# Patient Record
Sex: FEMALE | Race: WHITE | NOT HISPANIC OR LATINO | Employment: OTHER | URBAN - NONMETROPOLITAN AREA
[De-identification: names, ages, dates, MRNs, and addresses within clinical notes are randomized per-mention and may not be internally consistent; named-entity substitution may affect disease eponyms.]

---

## 2024-04-20 ENCOUNTER — HOSPITAL ENCOUNTER (EMERGENCY)
Facility: HOSPITAL | Age: 79
Discharge: HOME | End: 2024-04-20
Attending: EMERGENCY MEDICINE
Payer: MEDICARE

## 2024-04-20 VITALS
DIASTOLIC BLOOD PRESSURE: 94 MMHG | HEART RATE: 88 BPM | BODY MASS INDEX: 22.14 KG/M2 | SYSTOLIC BLOOD PRESSURE: 194 MMHG | OXYGEN SATURATION: 97 % | RESPIRATION RATE: 48 BRPM | WEIGHT: 125 LBS | TEMPERATURE: 99 F

## 2024-04-20 DIAGNOSIS — R10.9 ABDOMINAL PAIN, UNSPECIFIED ABDOMINAL LOCATION: ICD-10-CM

## 2024-04-20 DIAGNOSIS — K59.00 CONSTIPATION: Primary | ICD-10-CM

## 2024-04-20 LAB
ALBUMIN SERPL BCP-MCNC: 3.2 G/DL (ref 3.5–5.2)
ALP SERPL-CCNC: 87 U/L (ref 55–135)
ALT SERPL W/O P-5'-P-CCNC: 24 U/L (ref 10–44)
ANION GAP SERPL CALC-SCNC: 7 MMOL/L (ref 3–11)
AST SERPL-CCNC: 19 U/L (ref 10–40)
BASOPHILS # BLD AUTO: 0.02 K/UL (ref 0–0.2)
BASOPHILS NFR BLD: 0.2 % (ref 0–1.9)
BILIRUB SERPL-MCNC: 0.7 MG/DL (ref 0.1–1)
BUN SERPL-MCNC: 12 MG/DL (ref 8–23)
CALCIUM SERPL-MCNC: 9.2 MG/DL (ref 8.7–10.5)
CHLORIDE SERPL-SCNC: 100 MMOL/L (ref 95–110)
CO2 SERPL-SCNC: 29 MMOL/L (ref 23–29)
CREAT SERPL-MCNC: 0.7 MG/DL (ref 0.5–1.4)
DIFFERENTIAL METHOD BLD: ABNORMAL
EOSINOPHIL # BLD AUTO: 0 K/UL (ref 0–0.5)
EOSINOPHIL NFR BLD: 0.2 % (ref 0–8)
ERYTHROCYTE [DISTWIDTH] IN BLOOD BY AUTOMATED COUNT: 14.6 % (ref 11.5–14.5)
EST. GFR  (NO RACE VARIABLE): >60 ML/MIN/1.73 M^2
GLUCOSE SERPL-MCNC: 191 MG/DL (ref 70–110)
HCT VFR BLD AUTO: 41.1 % (ref 37–48.5)
HGB BLD-MCNC: 13.3 G/DL (ref 12–16)
IMM GRANULOCYTES # BLD AUTO: 0.06 K/UL (ref 0–0.04)
IMM GRANULOCYTES NFR BLD AUTO: 0.5 % (ref 0–0.5)
LYMPHOCYTES # BLD AUTO: 0.3 K/UL (ref 1–4.8)
LYMPHOCYTES NFR BLD: 2.6 % (ref 18–48)
MCH RBC QN AUTO: 27.7 PG (ref 27–31)
MCHC RBC AUTO-ENTMCNC: 32.4 G/DL (ref 32–36)
MCV RBC AUTO: 86 FL (ref 82–98)
MONOCYTES # BLD AUTO: 0.5 K/UL (ref 0.3–1)
MONOCYTES NFR BLD: 3.6 % (ref 4–15)
NEUTROPHILS # BLD AUTO: 11.6 K/UL (ref 1.8–7.7)
NEUTROPHILS NFR BLD: 92.9 % (ref 38–73)
NRBC BLD-RTO: 0 /100 WBC
PLATELET # BLD AUTO: 282 K/UL (ref 150–450)
PMV BLD AUTO: 9.3 FL (ref 9.2–12.9)
POTASSIUM SERPL-SCNC: 3.5 MMOL/L (ref 3.5–5.1)
PROT SERPL-MCNC: 8.2 G/DL (ref 6–8.4)
RBC # BLD AUTO: 4.8 M/UL (ref 4–5.4)
SODIUM SERPL-SCNC: 136 MMOL/L (ref 136–145)
WBC # BLD AUTO: 12.49 K/UL (ref 3.9–12.7)

## 2024-04-20 PROCEDURE — 63600175 PHARM REV CODE 636 W HCPCS: Performed by: NURSE PRACTITIONER

## 2024-04-20 PROCEDURE — 99285 EMERGENCY DEPT VISIT HI MDM: CPT | Mod: 25

## 2024-04-20 PROCEDURE — 96372 THER/PROPH/DIAG INJ SC/IM: CPT | Performed by: NURSE PRACTITIONER

## 2024-04-20 PROCEDURE — 80053 COMPREHEN METABOLIC PANEL: CPT | Performed by: NURSE PRACTITIONER

## 2024-04-20 PROCEDURE — 85025 COMPLETE CBC W/AUTO DIFF WBC: CPT | Performed by: NURSE PRACTITIONER

## 2024-04-20 PROCEDURE — 36415 COLL VENOUS BLD VENIPUNCTURE: CPT | Performed by: NURSE PRACTITIONER

## 2024-04-20 PROCEDURE — 25000003 PHARM REV CODE 250: Performed by: NURSE PRACTITIONER

## 2024-04-20 RX ORDER — LACTULOSE 10 G/15ML
20 SOLUTION ORAL
Status: COMPLETED | OUTPATIENT
Start: 2024-04-20 | End: 2024-04-20

## 2024-04-20 RX ORDER — DOCUSATE SODIUM 100 MG/1
100 CAPSULE, LIQUID FILLED ORAL 3 TIMES DAILY PRN
Qty: 30 CAPSULE | Refills: 0 | Status: SHIPPED | OUTPATIENT
Start: 2024-04-20 | End: 2024-05-01

## 2024-04-20 RX ORDER — FENTANYL CITRATE 50 UG/ML
75 INJECTION, SOLUTION INTRAMUSCULAR; INTRAVENOUS
Status: COMPLETED | OUTPATIENT
Start: 2024-04-20 | End: 2024-04-20

## 2024-04-20 RX ORDER — LACTULOSE 10 G/15ML
20 SOLUTION ORAL EVERY 8 HOURS PRN
Qty: 15 ML | Refills: 0 | Status: SHIPPED | OUTPATIENT
Start: 2024-04-20 | End: 2024-04-25

## 2024-04-20 RX ORDER — GLYCERIN 1 G/1
1 SUPPOSITORY RECTAL ONCE
Status: COMPLETED | OUTPATIENT
Start: 2024-04-20 | End: 2024-04-20

## 2024-04-20 RX ORDER — ONDANSETRON 4 MG/1
4 TABLET, ORALLY DISINTEGRATING ORAL
Status: COMPLETED | OUTPATIENT
Start: 2024-04-20 | End: 2024-04-20

## 2024-04-20 RX ADMIN — ONDANSETRON 4 MG: 4 TABLET, ORALLY DISINTEGRATING ORAL at 03:04

## 2024-04-20 RX ADMIN — GLYCERIN 1 SUPPOSITORY: 2 SUPPOSITORY RECTAL at 05:04

## 2024-04-20 RX ADMIN — LACTULOSE 20 G: 20 SOLUTION ORAL at 05:04

## 2024-04-20 RX ADMIN — FENTANYL CITRATE 75 MCG: 50 INJECTION INTRAMUSCULAR; INTRAVENOUS at 03:04

## 2024-04-20 NOTE — DISCHARGE INSTRUCTIONS
Drink plenty of fluids and follow a high-fiber diet.  Use medication as directed.  Plan to see your primary doctor in 1-2 days and return to the emergency department for worsening condition.

## 2024-04-20 NOTE — ED PROVIDER NOTES
Encounter Date: 2024       History     Chief Complaint   Patient presents with    Constipation     Pt stated that she has been experiencing constipation for the past few days. Last BM approx 3-4 days ago with minimal output.        This is an  78-year-old white female with a history of hypertension and CAD who presents to the emergency department with complaints of abdominal pain which she attributes to constipation.  She reports lower abdominal cramping pain with no bowel movement over the last 3-4 days.  She has also developed some mild nausea, however denies vomiting, dysuria, or any other relative symptoms at this time.  She has experienced similar symptoms in the past but reports today is worse.      Review of patient's allergies indicates:  No Known Allergies  Past Medical History:   Diagnosis Date    Coronary artery disease     High cholesterol     Hypertension      Past Surgical History:   Procedure Laterality Date    HYSTERECTOMY      OOPHORECTOMY       Family History   Problem Relation Name Age of Onset    Cancer Brother      Cancer Brother      No Known Problems Mother      No Known Problems Father      No Known Problems Sister      No Known Problems Daughter      No Known Problems Maternal Aunt      No Known Problems Maternal Uncle      No Known Problems Paternal Aunt      No Known Problems Paternal Uncle      No Known Problems Maternal Grandmother      No Known Problems Maternal Grandfather      No Known Problems Paternal Grandmother      No Known Problems Paternal Grandfather      Asthma Neg Hx      Emphysema Neg Hx      Breast cancer Neg Hx      Ovarian cancer Neg Hx      BRCA 1/2 Neg Hx       Social History     Tobacco Use    Smoking status: Former     Current packs/day: 0.00     Average packs/day: 1 pack/day for 30.0 years (30.0 ttl pk-yrs)     Types: Cigarettes     Start date: 1977     Quit date: 2007     Years since quittin.9    Smokeless tobacco: Never   Substance Use Topics     Alcohol use: No    Drug use: No     Review of Systems   Constitutional:  Negative for fever.   Gastrointestinal:  Positive for abdominal pain, constipation and nausea. Negative for diarrhea and vomiting.   Genitourinary:  Negative for dysuria.       Physical Exam     Initial Vitals   BP Pulse Resp Temp SpO2   04/20/24 1446 04/20/24 1443 04/20/24 1443 04/20/24 1443 04/20/24 1443   (!) 194/94 88 16 98.5 °F (36.9 °C) 97 %      MAP       --                Physical Exam    Nursing note and vitals reviewed.  Constitutional: She appears well-developed and well-nourished. She is active. No distress.   HENT:   Head: Normocephalic and atraumatic.   Eyes: EOM are normal. Pupils are equal, round, and reactive to light.   Neck: Neck supple.   Normal range of motion.  Cardiovascular:  Normal rate, regular rhythm and normal heart sounds.           Pulmonary/Chest: Breath sounds normal. No respiratory distress.   Abdominal: Bowel sounds are normal. She exhibits no distension. There is abdominal tenderness (lower generalized).   Musculoskeletal:      Cervical back: Normal range of motion and neck supple.     Neurological: She is alert and oriented to person, place, and time. GCS eye subscore is 4. GCS verbal subscore is 5. GCS motor subscore is 6.   Skin: Skin is warm and dry. Capillary refill takes less than 2 seconds.   Psychiatric: She has a normal mood and affect. Her behavior is normal. Thought content normal.         ED Course   Procedures  Labs Reviewed   CBC W/ AUTO DIFFERENTIAL - Abnormal; Notable for the following components:       Result Value    RDW 14.6 (*)     Gran # (ANC) 11.6 (*)     Immature Grans (Abs) 0.06 (*)     Lymph # 0.3 (*)     Gran % 92.9 (*)     Lymph % 2.6 (*)     Mono % 3.6 (*)     All other components within normal limits   COMPREHENSIVE METABOLIC PANEL - Abnormal; Notable for the following components:    Glucose 191 (*)     Albumin 3.2 (*)     All other components within normal limits           Imaging Results              CT Abdomen Pelvis  Without Contrast (In process)                       X-Ray Abdomen Flat And Erect (Final result)  Result time 04/20/24 16:41:29      Final result by Leeanne Mark MD (04/20/24 16:41:29)                   Impression:      No bowel obstruction    Calcified aortic aneurysm transversely the measurements are 7.4 cm recommend ultrasound for further evaluation This report was flagged in Epic as abnormal.    COMMUNICATION  This critical result was called to michelle rowley at time of dictation      Electronically signed by: Leeanne Mark MD  Date:    04/20/2024  Time:    16:41               Narrative:    EXAMINATION:  XR ABDOMEN FLAT AND ERECT    CLINICAL HISTORY:  Constipation, unspecified    COMPARISON:  None.    FINDINGS:  There is significant calcific densities in the soft tissues of the right pelvis.  There is abdominal aortic calcified aneurysm transversely it measures approximately 7.4 cm.                                       Medications   ondansetron disintegrating tablet 4 mg (4 mg Oral Given 4/20/24 1525)   fentaNYL injection 75 mcg (75 mcg Intramuscular Given 4/20/24 1525)   glycerin adult suppository 1 suppository (1 suppository Rectal Given 4/20/24 1735)   lactulose 20 gram/30 mL solution Soln 20 g (20 g Oral Given 4/20/24 1735)     Medical Decision Making  Amount and/or Complexity of Data Reviewed  Labs: ordered.  Radiology: ordered.    Risk  OTC drugs.  Prescription drug management.               ED Course as of 04/20/24 1812   Sat Apr 20, 2024   1755  Labs relatively unremarkable.  Two-view abdominal x-ray reveals possible abnormal gas pattern with potential for free air prompting CT abdomen pelvis which revealed no acute findings.  Patient reports currently under care of a physician for abdominal aneurysms.  Will treat for constipation. [CB]      ED Course User Index  [CB] Michelle Rowley NP                           Clinical Impression:  Final  diagnoses:  [K59.00] Constipation (Primary)  [R10.9] Abdominal pain, unspecified abdominal location          ED Disposition Condition    Discharge Stable          ED Prescriptions       Medication Sig Dispense Start Date End Date Auth. Provider    lactulose (CHRONULAC) 20 gram/30 mL Soln Take 30 mLs (20 g total) by mouth every 8 (eight) hours as needed (constipation). 15 mL 4/20/2024 4/25/2024 Michelle Rowley NP    docusate sodium (COLACE) 100 MG capsule Take 1 capsule (100 mg total) by mouth 3 (three) times daily as needed for Constipation. 30 capsule 4/20/2024 4/30/2024 Michelle Rowley NP          Follow-up Information       Follow up With Specialties Details Why Contact Info    PCP Follow UP  Schedule an appointment as soon as possible for a visit in 2 days for follow-up, for re-evaluation of today's complaint              Michelle Rowley NP  04/20/24 3821

## 2024-04-25 ENCOUNTER — HOSPITAL ENCOUNTER (EMERGENCY)
Facility: HOSPITAL | Age: 79
Discharge: HOME | End: 2024-04-25
Attending: EMERGENCY MEDICINE
Payer: MEDICARE

## 2024-04-25 VITALS
HEIGHT: 60 IN | BODY MASS INDEX: 22.38 KG/M2 | RESPIRATION RATE: 20 BRPM | OXYGEN SATURATION: 97 % | TEMPERATURE: 98 F | WEIGHT: 114 LBS | SYSTOLIC BLOOD PRESSURE: 114 MMHG | HEART RATE: 85 BPM | DIASTOLIC BLOOD PRESSURE: 75 MMHG

## 2024-04-25 DIAGNOSIS — M54.50 BILATERAL LOW BACK PAIN WITHOUT SCIATICA, UNSPECIFIED CHRONICITY: Primary | ICD-10-CM

## 2024-04-25 DIAGNOSIS — N39.0 URINARY TRACT INFECTION WITHOUT HEMATURIA, SITE UNSPECIFIED: ICD-10-CM

## 2024-04-25 LAB
ALBUMIN SERPL BCP-MCNC: 2.9 G/DL (ref 3.5–5.2)
ALP SERPL-CCNC: 81 U/L (ref 55–135)
ALT SERPL W/O P-5'-P-CCNC: 22 U/L (ref 10–44)
ANION GAP SERPL CALC-SCNC: 9 MMOL/L (ref 3–11)
AST SERPL-CCNC: 19 U/L (ref 10–40)
BACTERIA #/AREA URNS HPF: NEGATIVE /HPF
BASOPHILS # BLD AUTO: 0.02 K/UL (ref 0–0.2)
BASOPHILS NFR BLD: 0.2 % (ref 0–1.9)
BILIRUB SERPL-MCNC: 0.5 MG/DL (ref 0.1–1)
BILIRUB UR QL STRIP: ABNORMAL
BUN SERPL-MCNC: 15 MG/DL (ref 8–23)
CALCIUM SERPL-MCNC: 9.5 MG/DL (ref 8.7–10.5)
CHLORIDE SERPL-SCNC: 97 MMOL/L (ref 95–110)
CLARITY UR: CLEAR
CO2 SERPL-SCNC: 28 MMOL/L (ref 23–29)
COLOR UR: ABNORMAL
CREAT SERPL-MCNC: 0.8 MG/DL (ref 0.5–1.4)
DIFFERENTIAL METHOD BLD: ABNORMAL
EOSINOPHIL # BLD AUTO: 0.1 K/UL (ref 0–0.5)
EOSINOPHIL NFR BLD: 0.9 % (ref 0–8)
ERYTHROCYTE [DISTWIDTH] IN BLOOD BY AUTOMATED COUNT: 14.4 % (ref 11.5–14.5)
EST. GFR  (NO RACE VARIABLE): >60 ML/MIN/1.73 M^2
GLUCOSE SERPL-MCNC: 111 MG/DL (ref 70–110)
GLUCOSE UR QL STRIP: NEGATIVE
HCT VFR BLD AUTO: 38.1 % (ref 37–48.5)
HGB BLD-MCNC: 12.5 G/DL (ref 12–16)
HGB UR QL STRIP: NEGATIVE
HYALINE CASTS #/AREA URNS LPF: 0.3 /LPF
IMM GRANULOCYTES # BLD AUTO: 0.05 K/UL (ref 0–0.04)
IMM GRANULOCYTES NFR BLD AUTO: 0.5 % (ref 0–0.5)
KETONES UR QL STRIP: ABNORMAL
LEUKOCYTE ESTERASE UR QL STRIP: ABNORMAL
LYMPHOCYTES # BLD AUTO: 0.6 K/UL (ref 1–4.8)
LYMPHOCYTES NFR BLD: 5.8 % (ref 18–48)
MCH RBC QN AUTO: 27.7 PG (ref 27–31)
MCHC RBC AUTO-ENTMCNC: 32.8 G/DL (ref 32–36)
MCV RBC AUTO: 85 FL (ref 82–98)
MICROSCOPIC COMMENT: ABNORMAL
MONOCYTES # BLD AUTO: 0.9 K/UL (ref 0.3–1)
MONOCYTES NFR BLD: 8.4 % (ref 4–15)
NEUTROPHILS # BLD AUTO: 8.6 K/UL (ref 1.8–7.7)
NEUTROPHILS NFR BLD: 84.2 % (ref 38–73)
NITRITE UR QL STRIP: POSITIVE
NRBC BLD-RTO: 0 /100 WBC
PH UR STRIP: 5 [PH] (ref 5–8)
PLATELET # BLD AUTO: 291 K/UL (ref 150–450)
PMV BLD AUTO: 9.3 FL (ref 9.2–12.9)
POTASSIUM SERPL-SCNC: 3.8 MMOL/L (ref 3.5–5.1)
PROT SERPL-MCNC: 7.9 G/DL (ref 6–8.4)
PROT UR QL STRIP: ABNORMAL
RBC # BLD AUTO: 4.51 M/UL (ref 4–5.4)
RBC #/AREA URNS HPF: 5 /HPF (ref 0–4)
SODIUM SERPL-SCNC: 134 MMOL/L (ref 136–145)
SP GR UR STRIP: 1.02 (ref 1–1.03)
SQUAMOUS #/AREA URNS HPF: 0 /HPF
URN SPEC COLLECT METH UR: ABNORMAL
UROBILINOGEN UR STRIP-ACNC: 1 EU/DL
WBC # BLD AUTO: 10.19 K/UL (ref 3.9–12.7)
WBC #/AREA URNS HPF: 5 /HPF (ref 0–5)

## 2024-04-25 PROCEDURE — 96372 THER/PROPH/DIAG INJ SC/IM: CPT | Performed by: NURSE PRACTITIONER

## 2024-04-25 PROCEDURE — 36415 COLL VENOUS BLD VENIPUNCTURE: CPT | Performed by: NURSE PRACTITIONER

## 2024-04-25 PROCEDURE — 87086 URINE CULTURE/COLONY COUNT: CPT | Performed by: NURSE PRACTITIONER

## 2024-04-25 PROCEDURE — 63600175 PHARM REV CODE 636 W HCPCS: Performed by: NURSE PRACTITIONER

## 2024-04-25 PROCEDURE — 99284 EMERGENCY DEPT VISIT MOD MDM: CPT | Mod: 25

## 2024-04-25 PROCEDURE — 85025 COMPLETE CBC W/AUTO DIFF WBC: CPT | Performed by: NURSE PRACTITIONER

## 2024-04-25 PROCEDURE — 25000003 PHARM REV CODE 250: Performed by: NURSE PRACTITIONER

## 2024-04-25 PROCEDURE — 80053 COMPREHEN METABOLIC PANEL: CPT | Performed by: NURSE PRACTITIONER

## 2024-04-25 PROCEDURE — 81000 URINALYSIS NONAUTO W/SCOPE: CPT | Performed by: NURSE PRACTITIONER

## 2024-04-25 RX ORDER — NITROFURANTOIN 25; 75 MG/1; MG/1
100 CAPSULE ORAL 2 TIMES DAILY
Qty: 14 CAPSULE | Refills: 0 | Status: SHIPPED | OUTPATIENT
Start: 2024-04-25 | End: 2024-04-25

## 2024-04-25 RX ORDER — NITROFURANTOIN 25; 75 MG/1; MG/1
100 CAPSULE ORAL 2 TIMES DAILY
Qty: 14 CAPSULE | Refills: 0 | Status: SHIPPED | OUTPATIENT
Start: 2024-04-25 | End: 2024-04-25 | Stop reason: ALTCHOICE

## 2024-04-25 RX ORDER — HYDROCODONE BITARTRATE AND ACETAMINOPHEN 5; 325 MG/1; MG/1
1 TABLET ORAL EVERY 8 HOURS PRN
Qty: 15 TABLET | Refills: 0 | Status: SHIPPED | OUTPATIENT
Start: 2024-04-25 | End: 2024-05-01

## 2024-04-25 RX ORDER — ONDANSETRON 4 MG/1
4 TABLET, ORALLY DISINTEGRATING ORAL
Status: COMPLETED | OUTPATIENT
Start: 2024-04-25 | End: 2024-04-25

## 2024-04-25 RX ORDER — HYDROCODONE BITARTRATE AND ACETAMINOPHEN 5; 325 MG/1; MG/1
1 TABLET ORAL EVERY 8 HOURS PRN
Qty: 15 TABLET | Refills: 0 | Status: SHIPPED | OUTPATIENT
Start: 2024-04-25 | End: 2024-04-25

## 2024-04-25 RX ORDER — MORPHINE SULFATE 4 MG/ML
4 INJECTION, SOLUTION INTRAMUSCULAR; INTRAVENOUS
Status: COMPLETED | OUTPATIENT
Start: 2024-04-25 | End: 2024-04-25

## 2024-04-25 RX ADMIN — MORPHINE SULFATE 4 MG: 4 INJECTION INTRAVENOUS at 05:04

## 2024-04-25 RX ADMIN — ONDANSETRON 4 MG: 4 TABLET, ORALLY DISINTEGRATING ORAL at 05:04

## 2024-04-25 NOTE — DISCHARGE INSTRUCTIONS
Be sure to complete all antibiotics.  Eat a regular diet and drink plenty of fluids.  Empty your bladder frequently.  Plan to see your primary doctor for further evaluation of your symptoms and return to the emergency department for worsening condition.

## 2024-04-25 NOTE — ED PROVIDER NOTES
"Encounter Date: 4/25/2024       History     Chief Complaint   Patient presents with    Back Pain     Pt. Presents with back pain that started for a week. States she was here last week and got a shot that did not work.      This is an 78-year-old white female with a history of CAD, hypertension, abdominal aortic aneurysm who presents to the emergency department as instructed by her PCP for evaluation of lower back pain.  Patient reports that over the last week she has been experiencing bilateral lower back pain that is worse with movement.  She underwent urinalysis by her PCP today and was told she had "blood in the kidneys" and was referred to the ED for further evaluation.  The patient was evaluated here in the emergency department 1 week ago for complaints of abdominal pain and constipation which has since resolved.  She denies burning upon urination, fever, radiation of back pain into lower extremities, bladder/bowel dysfunction, or saddle paresthesias.      Review of patient's allergies indicates:  No Known Allergies  Past Medical History:   Diagnosis Date    Coronary artery disease     High cholesterol     Hypertension      Past Surgical History:   Procedure Laterality Date    HYSTERECTOMY      OOPHORECTOMY       Family History   Problem Relation Name Age of Onset    Cancer Brother      Cancer Brother      No Known Problems Mother      No Known Problems Father      No Known Problems Sister      No Known Problems Daughter      No Known Problems Maternal Aunt      No Known Problems Maternal Uncle      No Known Problems Paternal Aunt      No Known Problems Paternal Uncle      No Known Problems Maternal Grandmother      No Known Problems Maternal Grandfather      No Known Problems Paternal Grandmother      No Known Problems Paternal Grandfather      Asthma Neg Hx      Emphysema Neg Hx      Breast cancer Neg Hx      Ovarian cancer Neg Hx      BRCA 1/2 Neg Hx       Social History     Tobacco Use    Smoking status: " Former     Current packs/day: 0.00     Average packs/day: 1 pack/day for 30.0 years (30.0 ttl pk-yrs)     Types: Cigarettes     Start date: 1977     Quit date: 2007     Years since quittin.0    Smokeless tobacco: Never   Substance Use Topics    Alcohol use: No    Drug use: No     Review of Systems   Musculoskeletal:  Positive for back pain. Negative for gait problem.   Neurological:  Negative for numbness.       Physical Exam     Initial Vitals [24 1624]   BP Pulse Resp Temp SpO2   114/75 85 20 98.2 °F (36.8 °C) 97 %      MAP       --         Physical Exam    Nursing note and vitals reviewed.  Constitutional: She appears well-developed and well-nourished. She is active. No distress.   HENT:   Head: Normocephalic and atraumatic.   Eyes: EOM are normal. Pupils are equal, round, and reactive to light.   Neck: Neck supple.   Normal range of motion.  Cardiovascular:  Normal rate, regular rhythm and normal heart sounds.           Pulmonary/Chest: Breath sounds normal. No respiratory distress.   Musculoskeletal:         General: No tenderness. Normal range of motion.      Cervical back: Normal range of motion and neck supple.      Comments: The low back appears normal upon inspection, no rash or discoloration noted.  Patient is nontender to palpation, muscle strength to lower extremities is 5/5.     Neurological: She is alert and oriented to person, place, and time. GCS eye subscore is 4. GCS verbal subscore is 5. GCS motor subscore is 6.   Skin: Skin is warm and dry. Capillary refill takes less than 2 seconds.   Psychiatric: She has a normal mood and affect. Her behavior is normal. Thought content normal.         ED Course   Procedures  Labs Reviewed   URINALYSIS, REFLEX TO URINE CULTURE - Abnormal; Notable for the following components:       Result Value    Color, UA Orange (*)     Protein, UA 1+ (*)     Ketones, UA 2+ (*)     Bilirubin (UA) 2+ (*)     Nitrite, UA Positive (*)     Leukocytes, UA 2+  (*)     All other components within normal limits    Narrative:     Preferred Collection Type->Urine, Clean Catch  Specimen Source->Urine   CBC W/ AUTO DIFFERENTIAL - Abnormal; Notable for the following components:    Gran # (ANC) 8.6 (*)     Immature Grans (Abs) 0.05 (*)     Lymph # 0.6 (*)     Gran % 84.2 (*)     Lymph % 5.8 (*)     All other components within normal limits   COMPREHENSIVE METABOLIC PANEL - Abnormal; Notable for the following components:    Sodium 134 (*)     Glucose 111 (*)     Albumin 2.9 (*)     All other components within normal limits   URINALYSIS MICROSCOPIC - Abnormal; Notable for the following components:    RBC, UA 5 (*)     Hyaline Casts, UA 0.3 (*)     All other components within normal limits    Narrative:     Preferred Collection Type->Urine, Clean Catch  Specimen Source->Urine   CULTURE, URINE          Imaging Results    None          Medications   morphine injection 4 mg (4 mg Intramuscular Given 4/25/24 1731)   ondansetron disintegrating tablet 4 mg (4 mg Oral Given 4/25/24 1731)     Medical Decision Making  Amount and/or Complexity of Data Reviewed  Labs: ordered.    Risk  Prescription drug management.               ED Course as of 04/25/24 1810   Thu Apr 25, 2024   1741 Urinalysis reveals 5 white blood cells in the presence of positive nitrites.  Given ongoing low back pain will cover for urinary tract infection, culture pending [CB]   1810 Upon discharge pt recalls that her pcp had already called in antibiotics for uti.  [CB]      ED Course User Index  [CB] Michelle Rowley NP                           Clinical Impression:  Final diagnoses:  [M54.50] Bilateral low back pain without sciatica, unspecified chronicity (Primary)  [N39.0] Urinary tract infection without hematuria, site unspecified          ED Disposition Condition    Discharge Stable          ED Prescriptions       Medication Sig Dispense Start Date End Date Auth. Provider    nitrofurantoin,  macrocrystal-monohydrate, (MACROBID) 100 MG capsule  (Status: Discontinued) Take 1 capsule (100 mg total) by mouth 2 (two) times daily. for 7 days 14 capsule 4/25/2024 4/25/2024 Michelle Rowley NP    HYDROcodone-acetaminophen (NORCO) 5-325 mg per tablet  (Status: Discontinued) Take 1 tablet by mouth every 8 (eight) hours as needed for Pain. 15 tablet 4/25/2024 4/25/2024 Michelle Rowley NP    HYDROcodone-acetaminophen (NORCO) 5-325 mg per tablet Take 1 tablet by mouth every 8 (eight) hours as needed for Pain. 15 tablet 4/25/2024 4/30/2024 Michelle Rowley NP    nitrofurantoin, macrocrystal-monohydrate, (MACROBID) 100 MG capsule  (Status: Discontinued) Take 1 capsule (100 mg total) by mouth 2 (two) times daily. for 7 days 14 capsule 4/25/2024 4/25/2024 Michelle Rowley NP          Follow-up Information       Follow up With Specialties Details Why Contact Info    PCP Follow UP  Schedule an appointment as soon as possible for a visit in 2 days for follow-up, for re-evaluation of today's complaint              Michelle Rowley NP  04/25/24 [id number]       Michelle Rowley NP  04/25/24 [id number]

## 2024-04-27 ENCOUNTER — HOSPITAL ENCOUNTER (EMERGENCY)
Facility: HOSPITAL | Age: 79
Discharge: HOME | End: 2024-04-28
Attending: STUDENT IN AN ORGANIZED HEALTH CARE EDUCATION/TRAINING PROGRAM
Payer: MEDICARE

## 2024-04-27 DIAGNOSIS — N39.0 URINARY TRACT INFECTION WITHOUT HEMATURIA, SITE UNSPECIFIED: Primary | ICD-10-CM

## 2024-04-27 DIAGNOSIS — I71.43 INFRARENAL ABDOMINAL AORTIC ANEURYSM (AAA) WITHOUT RUPTURE: ICD-10-CM

## 2024-04-27 LAB
ALBUMIN SERPL BCP-MCNC: 2.6 G/DL (ref 3.5–5.2)
ALP SERPL-CCNC: 73 U/L (ref 55–135)
ALT SERPL W/O P-5'-P-CCNC: 19 U/L (ref 10–44)
ANION GAP SERPL CALC-SCNC: 6 MMOL/L (ref 3–11)
AST SERPL-CCNC: 18 U/L (ref 10–40)
BACTERIA #/AREA URNS HPF: NEGATIVE /HPF
BACTERIA UR CULT: NORMAL
BACTERIA UR CULT: NORMAL
BASOPHILS # BLD AUTO: 0.03 K/UL (ref 0–0.2)
BASOPHILS NFR BLD: 0.3 % (ref 0–1.9)
BILIRUB SERPL-MCNC: 0.4 MG/DL (ref 0.1–1)
BILIRUB UR QL STRIP: NEGATIVE
BUN SERPL-MCNC: 13 MG/DL (ref 8–23)
CALCIUM SERPL-MCNC: 9.1 MG/DL (ref 8.7–10.5)
CHLORIDE SERPL-SCNC: 100 MMOL/L (ref 95–110)
CLARITY UR: ABNORMAL
CO2 SERPL-SCNC: 30 MMOL/L (ref 23–29)
COLOR UR: YELLOW
CREAT SERPL-MCNC: 0.9 MG/DL (ref 0.5–1.4)
DIFFERENTIAL METHOD BLD: ABNORMAL
EOSINOPHIL # BLD AUTO: 0.1 K/UL (ref 0–0.5)
EOSINOPHIL NFR BLD: 0.4 % (ref 0–8)
ERYTHROCYTE [DISTWIDTH] IN BLOOD BY AUTOMATED COUNT: 14.6 % (ref 11.5–14.5)
EST. GFR  (NO RACE VARIABLE): >60 ML/MIN/1.73 M^2
GLUCOSE SERPL-MCNC: 139 MG/DL (ref 70–110)
GLUCOSE UR QL STRIP: NEGATIVE
HCT VFR BLD AUTO: 37.4 % (ref 37–48.5)
HGB BLD-MCNC: 11.7 G/DL (ref 12–16)
HGB UR QL STRIP: ABNORMAL
HYALINE CASTS #/AREA URNS LPF: 0.3 /LPF
IMM GRANULOCYTES # BLD AUTO: 0.09 K/UL (ref 0–0.04)
IMM GRANULOCYTES NFR BLD AUTO: 0.8 % (ref 0–0.5)
KETONES UR QL STRIP: ABNORMAL
LEUKOCYTE ESTERASE UR QL STRIP: ABNORMAL
LYMPHOCYTES # BLD AUTO: 0.7 K/UL (ref 1–4.8)
LYMPHOCYTES NFR BLD: 5.7 % (ref 18–48)
MCH RBC QN AUTO: 27.4 PG (ref 27–31)
MCHC RBC AUTO-ENTMCNC: 31.3 G/DL (ref 32–36)
MCV RBC AUTO: 88 FL (ref 82–98)
MICROSCOPIC COMMENT: ABNORMAL
MONOCYTES # BLD AUTO: 0.9 K/UL (ref 0.3–1)
MONOCYTES NFR BLD: 7.7 % (ref 4–15)
NEUTROPHILS # BLD AUTO: 9.7 K/UL (ref 1.8–7.7)
NEUTROPHILS NFR BLD: 85.1 % (ref 38–73)
NITRITE UR QL STRIP: POSITIVE
NRBC BLD-RTO: 0 /100 WBC
PH UR STRIP: 7 [PH] (ref 5–8)
PLATELET # BLD AUTO: 249 K/UL (ref 150–450)
PMV BLD AUTO: 9.2 FL (ref 9.2–12.9)
POTASSIUM SERPL-SCNC: 4.4 MMOL/L (ref 3.5–5.1)
PROT SERPL-MCNC: 7 G/DL (ref 6–8.4)
PROT UR QL STRIP: NEGATIVE
RBC # BLD AUTO: 4.27 M/UL (ref 4–5.4)
RBC #/AREA URNS HPF: 2 /HPF (ref 0–4)
SODIUM SERPL-SCNC: 136 MMOL/L (ref 136–145)
SP GR UR STRIP: <=1.005 (ref 1–1.03)
SQUAMOUS #/AREA URNS HPF: 5 /HPF
URN SPEC COLLECT METH UR: ABNORMAL
UROBILINOGEN UR STRIP-ACNC: 1 EU/DL
WBC # BLD AUTO: 11.41 K/UL (ref 3.9–12.7)
WBC #/AREA URNS HPF: 73 /HPF (ref 0–5)

## 2024-04-27 PROCEDURE — 25000003 PHARM REV CODE 250: Performed by: STUDENT IN AN ORGANIZED HEALTH CARE EDUCATION/TRAINING PROGRAM

## 2024-04-27 PROCEDURE — 96375 TX/PRO/DX INJ NEW DRUG ADDON: CPT

## 2024-04-27 PROCEDURE — 96374 THER/PROPH/DIAG INJ IV PUSH: CPT

## 2024-04-27 PROCEDURE — 81000 URINALYSIS NONAUTO W/SCOPE: CPT | Performed by: STUDENT IN AN ORGANIZED HEALTH CARE EDUCATION/TRAINING PROGRAM

## 2024-04-27 PROCEDURE — 36415 COLL VENOUS BLD VENIPUNCTURE: CPT | Performed by: STUDENT IN AN ORGANIZED HEALTH CARE EDUCATION/TRAINING PROGRAM

## 2024-04-27 PROCEDURE — 99285 EMERGENCY DEPT VISIT HI MDM: CPT | Mod: 25

## 2024-04-27 PROCEDURE — 87086 URINE CULTURE/COLONY COUNT: CPT | Performed by: STUDENT IN AN ORGANIZED HEALTH CARE EDUCATION/TRAINING PROGRAM

## 2024-04-27 PROCEDURE — 85025 COMPLETE CBC W/AUTO DIFF WBC: CPT | Performed by: STUDENT IN AN ORGANIZED HEALTH CARE EDUCATION/TRAINING PROGRAM

## 2024-04-27 PROCEDURE — 80053 COMPREHEN METABOLIC PANEL: CPT | Performed by: STUDENT IN AN ORGANIZED HEALTH CARE EDUCATION/TRAINING PROGRAM

## 2024-04-27 PROCEDURE — 96361 HYDRATE IV INFUSION ADD-ON: CPT

## 2024-04-27 PROCEDURE — 63600175 PHARM REV CODE 636 W HCPCS: Performed by: STUDENT IN AN ORGANIZED HEALTH CARE EDUCATION/TRAINING PROGRAM

## 2024-04-27 RX ORDER — ONDANSETRON HYDROCHLORIDE 2 MG/ML
4 INJECTION, SOLUTION INTRAVENOUS
Status: COMPLETED | OUTPATIENT
Start: 2024-04-27 | End: 2024-04-27

## 2024-04-27 RX ORDER — KETOROLAC TROMETHAMINE 30 MG/ML
15 INJECTION, SOLUTION INTRAMUSCULAR; INTRAVENOUS
Status: COMPLETED | OUTPATIENT
Start: 2024-04-27 | End: 2024-04-27

## 2024-04-27 RX ADMIN — KETOROLAC TROMETHAMINE 15 MG: 30 INJECTION, SOLUTION INTRAMUSCULAR; INTRAVENOUS at 10:04

## 2024-04-27 RX ADMIN — SODIUM CHLORIDE 500 ML: 9 INJECTION, SOLUTION INTRAVENOUS at 10:04

## 2024-04-27 RX ADMIN — ONDANSETRON 4 MG: 2 INJECTION INTRAMUSCULAR; INTRAVENOUS at 10:04

## 2024-04-28 VITALS
OXYGEN SATURATION: 99 % | TEMPERATURE: 98 F | RESPIRATION RATE: 18 BRPM | BODY MASS INDEX: 22.26 KG/M2 | DIASTOLIC BLOOD PRESSURE: 76 MMHG | SYSTOLIC BLOOD PRESSURE: 128 MMHG | WEIGHT: 114 LBS | HEART RATE: 95 BPM

## 2024-04-28 NOTE — ED NOTES
MD at bedside discussing possible transfer with pt. Pt states to see what a consulting vascular specialist says then let her know because she is ready to go home.

## 2024-04-28 NOTE — DISCHARGE INSTRUCTIONS
Continue abx as previous prescribed    F/u with vascular surgery for evaluation of AAA    Use colace as needed for constipation symptoms

## 2024-04-28 NOTE — ED PROVIDER NOTES
History  Chief Complaint   Patient presents with    Weakness     Pt reports coming to ER this past Saturday, her PCP on Tuesday and back to ER on Wednesday. Her diagnosis was constipation on Saturday, on Tuesday diagnosed with UTI. Pt now taking antibiotics and pain medications. PT reports coming in tonight due to having pain in lower back and side. Pt reports not taking narcos due to the constipation, pt also not taking Colace that was prescribed,.      78-year-old female presents for evaluation of lower abdominal discomfort.  Patient reports she was seen here 1 week ago for constipation.  Patient given what I presumed to be magnesium citrate with significant resolution of her symptoms.  Thereafter patient notes that she was extremely fatigued.  Patient was seen by her PCP in the head to come back to the ER 3 days ago she was diagnosed with a urinary tract infection given prescription for antibiotics as well as pain control.  Patient notes pain medications not helping she was continuing to experience lower abdominal pain radiating to her back.        Past Medical History:   Diagnosis Date    Coronary artery disease     High cholesterol     Hypertension        Past Surgical History:   Procedure Laterality Date    HYSTERECTOMY      OOPHORECTOMY         Family History   Problem Relation Name Age of Onset    Cancer Brother      Cancer Brother      No Known Problems Mother      No Known Problems Father      No Known Problems Sister      No Known Problems Daughter      No Known Problems Maternal Aunt      No Known Problems Maternal Uncle      No Known Problems Paternal Aunt      No Known Problems Paternal Uncle      No Known Problems Maternal Grandmother      No Known Problems Maternal Grandfather      No Known Problems Paternal Grandmother      No Known Problems Paternal Grandfather      Asthma Neg Hx      Emphysema Neg Hx      Breast cancer Neg Hx      Ovarian cancer Neg Hx      BRCA 1/2 Neg Hx         Social History      Tobacco Use    Smoking status: Former     Current packs/day: 0.00     Average packs/day: 1 pack/day for 30.0 years (30.0 ttl pk-yrs)     Types: Cigarettes     Start date: 1977     Quit date: 2007     Years since quittin.0    Smokeless tobacco: Never   Substance Use Topics    Alcohol use: No    Drug use: No       ROS  Review of Systems   Gastrointestinal:  Positive for abdominal pain.       Physical Exam  /76   Pulse 95   Temp 98.3 °F (36.8 °C)   Resp 18   Wt 51.7 kg (114 lb)   SpO2 99%   Breastfeeding No   BMI 22.26 kg/m²   Physical Exam    Constitutional: She appears well-developed and well-nourished. She is cooperative.   HENT:   Head: Normocephalic and atraumatic.   Eyes: Conjunctivae, EOM and lids are normal. Pupils are equal, round, and reactive to light.   Neck: Phonation normal.   Normal range of motion.  Cardiovascular:  Normal rate, regular rhythm and intact distal pulses.           Pulmonary/Chest: Breath sounds normal. No respiratory distress.   Abdominal: There is abdominal tenderness.   Musculoskeletal:      Cervical back: Normal range of motion.     Neurological: She is alert and oriented to person, place, and time.   Skin: Skin is warm and dry.   Psychiatric: She has a normal mood and affect. Her speech is normal and behavior is normal.               Labs Reviewed   CBC W/ AUTO DIFFERENTIAL - Abnormal; Notable for the following components:       Result Value    Hemoglobin 11.7 (*)     MCHC 31.3 (*)     RDW 14.6 (*)     Immature Granulocytes 0.8 (*)     Gran # (ANC) 9.7 (*)     Immature Grans (Abs) 0.09 (*)     Lymph # 0.7 (*)     Gran % 85.1 (*)     Lymph % 5.7 (*)     All other components within normal limits   COMPREHENSIVE METABOLIC PANEL - Abnormal; Notable for the following components:    CO2 30 (*)     Glucose 139 (*)     Albumin 2.6 (*)     All other components within normal limits   URINALYSIS, REFLEX TO URINE CULTURE - Abnormal; Notable for the following  components:    Appearance, UA Cloudy (*)     Specific Gravity, UA <=1.005 (*)     Ketones, UA Trace (*)     Occult Blood UA Trace (*)     Nitrite, UA Positive (*)     Leukocytes, UA 3+ (*)     All other components within normal limits    Narrative:     Preferred Collection Type->Urine, Clean Catch  Specimen Source->Urine   URINALYSIS MICROSCOPIC - Abnormal; Notable for the following components:    WBC, UA 73 (*)     Hyaline Casts, UA 0.3 (*)     All other components within normal limits    Narrative:     Preferred Collection Type->Urine, Clean Catch  Specimen Source->Urine   CULTURE, URINE    Narrative:     Preferred Collection Type->Urine, Clean Catch  Specimen Source->Urine           Imaging Results              CT Renal Stone Study ABD Pelvis WO (Final result)  Result time 04/28/24 10:40:49      Final result by Qamar Burch MD (04/28/24 10:40:49)                   Impression:      1. Infrarenal abdominal aortic aneurysm measuring 7 cm, slightly increased in size from CT of 04/20/2024 where it measured 6.5 cm.  2. No renal abnormality.  3.  A preliminary report was faxed by [facility] shortly after completion of this examination.      Electronically signed by: Qamar Burch MD  Date:    04/28/2024  Time:    10:40               Narrative:    EXAMINATION:  CT RENAL STONE STUDY ABD PELVIS WO    CLINICAL HISTORY:  Flank pain, suspected pyelonephritis;    TECHNIQUE:  Axial CT images were obtained. Iterative reconstruction technique was used. CT/cardiac nuclear exam/s in prior 12 months: 4.    COMPARISON:  CT abdomen pelvis without IV contrast 04/20/2024.    FINDINGS:  No hepatic abnormality.  No gallstones.  Spleen, pancreas, adrenal glands demonstrate no abnormality.    No right renal abnormality.    Some chronic vascular calcifications in the left renal hilum.  No gross gastric abnormality.  No dilated bowel.  The appendix is normal.  No free fluid or free air.  No abnormality of urinary bladder.   There is an infrarenal abdominal aortic aneurysm measuring 7 cm, slightly increased in size from prior exam where it measured 6.5 cm.  No lymph node enlargement.  Visualized lung bases are clear.  No osseous abnormality.                                                  Procedures             Medical Decision Making  Differentials include nephrolithiasis, continue cystitis symptoms, diverticulitis or appendicitis.  Will obtain CT for further evaluation.  See ED course for updates    Amount and/or Complexity of Data Reviewed  External Data Reviewed: radiology and notes.     Details: CT ABDOMEN PELVIS WITHOUT CONTRAST     CLINICAL HISTORY:  Abdominal abscess/infection suspected;Bowel obstruction suspected;     TECHNIQUE:  Iterative reconstruction technique was used.     CT/Cardiac Nuclear exams in prior 12 months: 0     COMPARISON:  None.     FINDINGS:  Patient has a large abdominal aortic aneurysm measuring 6.1 x 6.6 cm with marked calcified wall.  The non contrasted liver, spleen are normal.  There are chronic changes of the pancreas and vascular calcifications.  Left renal cyst 5 cm there is a left lower abdominal wall defect measuring 1.5 cm containing fat and omentum but no bowel in the hernia and no bowel obstruction     Impression:     1. 6.1 x 6.6 cm infrarenal abdominal aortic aneurysm  2. Left lower anterior abdominal wall hernia containing fat with no bowel in the hernia and no bowel obstruction    Labs: ordered. Decision-making details documented in ED Course.  Radiology: ordered.    Risk  Prescription drug management.               ED Course as of 04/30/24 1828   Sat Apr 27, 2024   2202 CBC and CMP unremarkable [NA]   Sun Apr 28, 2024   0005 CT notable for aneurysmal dilation of the infrarenal abdominal aorta measuring 7 cm, slightly increased in size when compared to prior CT.  No other acute findings detected [NA]   0012 NITRITE UA(!): Positive [NA]   0012 Leukocyte Esterase, UA(!): 3+ [NA]   0012 WBC,  UA(!): 73 [NA]   0014 Spoke with vascular surgery Dr. Chavis, he examined it and believes that it is not bigger as previously noted.    He will be in clinic in [place] on Monday and Tuesday, he advised that she come in for evaluation as she does need to have it repaired.    Pt advised to continue abx for UTI as prescribed and f/u with PCP [NA]      ED Course User Index  [NA] Devin Vyas MD       DISCHARGE NOTE:       Brenda Cotton's  results have been reviewed with her.  She has been counseled regarding her diagnosis, treatment, and plan.  She verbally conveys understanding and agreement of the signs, symptoms, diagnosis, treatment and prognosis and additionally agrees to follow up as discussed.  She also agrees with the care-plan and conveys that all of her questions have been answered.  I have also provided discharge instructions for her that include: educational information regarding their diagnosis and treatment, and list of reasons why they would want to return to the ED prior to their follow-up appointment, should her condition change. She has been provided with education for proper emergency department utilization.      CLINICAL IMPRESSION:         1. Urinary tract infection without hematuria, site unspecified    2. Infrarenal abdominal aortic aneurysm (AAA) without rupture              PLAN:   1. Discharge  2.   Discharge Medication List as of 4/28/2024 12:35 AM        3. Humberto Chavis MD  [address] [facility] [id number] [phone]          Humberto Chavis  Address: [address] [place] [address]  Phone: [phone]  Call on 4/29/2024  Call monday for an appointment          Devin Vyas MD  04/30/24 [id number]

## 2024-04-29 LAB
BACTERIA UR CULT: NORMAL
BACTERIA UR CULT: NORMAL

## 2024-05-03 PROBLEM — I71.40 AAA (ABDOMINAL AORTIC ANEURYSM) WITHOUT RUPTURE: Status: ACTIVE | Noted: 2024-05-03

## 2024-05-20 ENCOUNTER — LAB VISIT (OUTPATIENT)
Dept: LAB | Facility: HOSPITAL | Age: 79
End: 2024-05-20
Payer: MEDICARE

## 2024-05-20 DIAGNOSIS — E55.9 AVITAMINOSIS D: ICD-10-CM

## 2024-05-20 DIAGNOSIS — R53.82 CHRONIC FATIGUE: ICD-10-CM

## 2024-05-20 DIAGNOSIS — R53.1 ASTHENIA: ICD-10-CM

## 2024-05-20 LAB
25(OH)D3+25(OH)D2 SERPL-MCNC: 102 NG/ML (ref 30–96)
ALBUMIN SERPL BCP-MCNC: 2.7 G/DL (ref 3.5–5.2)
ALP SERPL-CCNC: 80 U/L (ref 55–135)
ALT SERPL W/O P-5'-P-CCNC: 13 U/L (ref 10–44)
ANION GAP SERPL CALC-SCNC: 6 MMOL/L (ref 3–11)
AST SERPL-CCNC: 19 U/L (ref 10–40)
BASOPHILS # BLD AUTO: 0.03 K/UL (ref 0–0.2)
BASOPHILS NFR BLD: 0.3 % (ref 0–1.9)
BILIRUB SERPL-MCNC: 1 MG/DL (ref 0.1–1)
BUN SERPL-MCNC: 16 MG/DL (ref 8–23)
CALCIUM SERPL-MCNC: 9.3 MG/DL (ref 8.7–10.5)
CHLORIDE SERPL-SCNC: 104 MMOL/L (ref 95–110)
CO2 SERPL-SCNC: 27 MMOL/L (ref 23–29)
CREAT SERPL-MCNC: 0.8 MG/DL (ref 0.5–1.4)
DIFFERENTIAL METHOD BLD: ABNORMAL
EOSINOPHIL # BLD AUTO: 0.1 K/UL (ref 0–0.5)
EOSINOPHIL NFR BLD: 1.2 % (ref 0–8)
ERYTHROCYTE [DISTWIDTH] IN BLOOD BY AUTOMATED COUNT: 15.9 % (ref 11.5–14.5)
EST. GFR  (NO RACE VARIABLE): >60 ML/MIN/1.73 M^2
FERRITIN SERPL-MCNC: 502 NG/ML (ref 20–300)
GLUCOSE SERPL-MCNC: 91 MG/DL (ref 70–110)
HCT VFR BLD AUTO: 32.8 % (ref 37–48.5)
HGB BLD-MCNC: 10.4 G/DL (ref 12–16)
IMM GRANULOCYTES # BLD AUTO: 0.06 K/UL (ref 0–0.04)
IMM GRANULOCYTES NFR BLD AUTO: 0.6 % (ref 0–0.5)
IRON SATN MFR SERPL: 12 % (ref 20–50)
IRON SERPL-MCNC: 27 UG/DL (ref 30–160)
LYMPHOCYTES # BLD AUTO: 0.7 K/UL (ref 1–4.8)
LYMPHOCYTES NFR BLD: 7.7 % (ref 18–48)
MCH RBC QN AUTO: 26.9 PG (ref 27–31)
MCHC RBC AUTO-ENTMCNC: 31.7 G/DL (ref 32–36)
MCV RBC AUTO: 85 FL (ref 82–98)
MONOCYTES # BLD AUTO: 0.9 K/UL (ref 0.3–1)
MONOCYTES NFR BLD: 9.2 % (ref 4–15)
NEUTROPHILS # BLD AUTO: 7.5 K/UL (ref 1.8–7.7)
NEUTROPHILS NFR BLD: 81 % (ref 38–73)
NRBC BLD-RTO: 0 /100 WBC
PLATELET # BLD AUTO: 281 K/UL (ref 150–450)
PMV BLD AUTO: 9.4 FL (ref 9.2–12.9)
POTASSIUM SERPL-SCNC: 3.5 MMOL/L (ref 3.5–5.1)
PROT SERPL-MCNC: 7.1 G/DL (ref 6–8.4)
RBC # BLD AUTO: 3.87 M/UL (ref 4–5.4)
SODIUM SERPL-SCNC: 137 MMOL/L (ref 136–145)
TOTAL IRON BINDING CAPACITY: 224 UG/DL (ref 250–450)
VIT B12 SERPL-MCNC: 448 PG/ML (ref 210–950)
WBC # BLD AUTO: 9.32 K/UL (ref 3.9–12.7)

## 2024-05-20 PROCEDURE — 85025 COMPLETE CBC W/AUTO DIFF WBC: CPT

## 2024-05-20 PROCEDURE — 36415 COLL VENOUS BLD VENIPUNCTURE: CPT

## 2024-05-20 PROCEDURE — 80053 COMPREHEN METABOLIC PANEL: CPT

## 2024-05-20 PROCEDURE — 82728 ASSAY OF FERRITIN: CPT | Mod: GA

## 2024-05-20 PROCEDURE — 83540 ASSAY OF IRON: CPT

## 2024-05-20 PROCEDURE — 82306 VITAMIN D 25 HYDROXY: CPT

## 2024-05-20 PROCEDURE — 82607 VITAMIN B-12: CPT | Mod: GA

## 2024-08-20 ENCOUNTER — HOSPITAL ENCOUNTER (OUTPATIENT)
Dept: RADIOLOGY | Facility: HOSPITAL | Age: 79
Discharge: HOME | End: 2024-08-20
Payer: MEDICARE

## 2024-08-20 VITALS — HEIGHT: 63 IN | BODY MASS INDEX: 21.44 KG/M2 | WEIGHT: 121 LBS

## 2024-08-20 DIAGNOSIS — Z78.0 POST-MENOPAUSE: ICD-10-CM

## 2024-08-20 DIAGNOSIS — Z12.31 ENCOUNTER FOR SCREENING MAMMOGRAM FOR MALIGNANT NEOPLASM OF BREAST: ICD-10-CM

## 2024-08-20 PROCEDURE — 77091 TBS TECHL CALCULATION ONLY: CPT

## 2024-08-20 PROCEDURE — 77067 SCR MAMMO BI INCL CAD: CPT | Mod: TC

## 2024-09-24 PROBLEM — I20.9 ANGINA, CLASS III: Status: ACTIVE | Noted: 2024-01-01

## 2024-09-24 PROBLEM — R94.39 ABNORMAL MYOCARDIAL PERFUSION STUDY: Status: ACTIVE | Noted: 2024-01-01

## 2025-01-01 ENCOUNTER — HOSPITAL ENCOUNTER (EMERGENCY)
Facility: HOSPITAL | Age: 80
Discharge: HOME | End: 2025-03-03
Attending: INTERNAL MEDICINE
Payer: MEDICARE

## 2025-01-01 VITALS
RESPIRATION RATE: 22 BRPM | SYSTOLIC BLOOD PRESSURE: 100 MMHG | HEART RATE: 84 BPM | TEMPERATURE: 91 F | BODY MASS INDEX: 17.72 KG/M2 | OXYGEN SATURATION: 100 % | HEIGHT: 63 IN | DIASTOLIC BLOOD PRESSURE: 50 MMHG | WEIGHT: 100 LBS

## 2025-01-01 DIAGNOSIS — A41.9 SEPTIC SHOCK: ICD-10-CM

## 2025-01-01 DIAGNOSIS — R06.00 DYSPNEA: ICD-10-CM

## 2025-01-01 DIAGNOSIS — Z45.2 ENCOUNTER FOR CENTRAL LINE PLACEMENT: ICD-10-CM

## 2025-01-01 DIAGNOSIS — E87.20 METABOLIC ACIDOSIS: ICD-10-CM

## 2025-01-01 DIAGNOSIS — K55.029 ISCHEMIC NECROSIS OF SMALL BOWEL: Primary | ICD-10-CM

## 2025-01-01 DIAGNOSIS — R65.21 SEPTIC SHOCK: ICD-10-CM

## 2025-01-01 DIAGNOSIS — Z97.8 ENDOTRACHEAL TUBE PRESENT: ICD-10-CM

## 2025-01-01 LAB
ABO + RH BLD: NORMAL
AC: 18
ALBUMIN SERPL BCP-MCNC: 3.1 G/DL (ref 3.5–5.2)
ALP SERPL-CCNC: 97 U/L (ref 55–135)
ALT SERPL W/O P-5'-P-CCNC: 204 U/L (ref 10–44)
ANION GAP SERPL CALC-SCNC: 28 MMOL/L (ref 8–16)
APTT PPP: 27.7 SEC (ref 21–32)
AST SERPL-CCNC: 231 U/L (ref 10–40)
BACTERIA #/AREA URNS HPF: ABNORMAL /HPF
BASOPHILS NFR BLD: 0 % (ref 0–1.9)
BILIRUB SERPL-MCNC: 1.3 MG/DL (ref 0.1–1)
BILIRUB UR QL STRIP: NEGATIVE
BLD GP AB SCN CELLS X3 SERPL QL: NORMAL
BUN SERPL-MCNC: 36 MG/DL (ref 8–23)
CALCIUM SERPL-MCNC: 10.3 MG/DL (ref 8.7–10.5)
CHLORIDE SERPL-SCNC: 95 MMOL/L (ref 95–110)
CLARITY UR: CLEAR
CO2 SERPL-SCNC: 16 MMOL/L (ref 23–29)
COLOR UR: YELLOW
CREAT SERPL-MCNC: 1.9 MG/DL (ref 0.5–1.4)
D DIMER PPP IA.FEU-MCNC: 28.98 MG/L FEU
DIFFERENTIAL METHOD BLD: ABNORMAL
EOSINOPHIL NFR BLD: 0 % (ref 0–8)
ERYTHROCYTE [DISTWIDTH] IN BLOOD BY AUTOMATED COUNT: 14.7 % (ref 11.5–14.5)
EST. GFR  (NO RACE VARIABLE): 26.2 ML/MIN/1.73 M^2
FIO2: 100 %
FIO2: 60 %
GLUCOSE SERPL-MCNC: 64 MG/DL (ref 70–110)
GLUCOSE UR QL STRIP: ABNORMAL
HCT VFR BLD AUTO: 41.4 % (ref 37–48.5)
HGB BLD-MCNC: 12.6 G/DL (ref 12–16)
HGB UR QL STRIP: ABNORMAL
HYALINE CASTS #/AREA URNS LPF: 14 /LPF
IMM GRANULOCYTES # BLD AUTO: ABNORMAL K/UL (ref 0–0.04)
IMM GRANULOCYTES NFR BLD AUTO: ABNORMAL % (ref 0–0.5)
INR PPP: 1.4 (ref 0.8–1.2)
KETONES UR QL STRIP: NEGATIVE
LACTATE SERPL-SCNC: >12 MMOL/L (ref 0.5–2.2)
LEUKOCYTE ESTERASE UR QL STRIP: NEGATIVE
LIPASE SERPL-CCNC: 7 U/L (ref 4–60)
LPM: 15
LYMPHOCYTES NFR BLD: 9 % (ref 18–48)
Lab: ABNORMAL
Lab: ABNORMAL
MCH RBC QN AUTO: 28.1 PG (ref 27–31)
MCHC RBC AUTO-ENTMCNC: 30.4 G/DL (ref 32–36)
MCV RBC AUTO: 92 FL (ref 82–98)
METAMYELOCYTES NFR BLD MANUAL: 1 %
MICROSCOPIC COMMENT: ABNORMAL
MODIFIED ALLEN'S TEST: ABNORMAL
MONOCYTES NFR BLD: 4 % (ref 4–15)
NEUTROPHILS NFR BLD: 80 % (ref 38–73)
NEUTS BAND NFR BLD MANUAL: 6 %
NITRITE UR QL STRIP: NEGATIVE
NOTIFIED BY: ABNORMAL
NOTIFIED BY: ABNORMAL
NRBC BLD-RTO: 0 /100 WBC
O2DEVICE: ABNORMAL
O2DEVICE: ABNORMAL
OHS QRS DURATION: 72 MS
OHS QTC CALCULATION: 447 MS
PCO2 BLDA: 36.5 MMHG (ref 35–45)
PCO2 BLDA: 36.8 MMHG (ref 35–45)
PEAK FLOW: 60
PEEP: 5
PH SMN: 7.15 [PH] (ref 7.34–7.45)
PH SMN: 7.21 [PH] (ref 7.34–7.45)
PH UR STRIP: 5 [PH] (ref 5–8)
PLATELET # BLD AUTO: 257 K/UL (ref 150–450)
PMV BLD AUTO: 10.5 FL (ref 9.2–12.9)
PO2 BLDA: 285 MMHG (ref 80–100)
PO2 BLDA: 351 MMHG (ref 80–100)
POC BASE DEFICIT: -13 MMOL/L (ref -2–2)
POC BASE DEFICIT: -15.9 MMOL/L (ref -2–2)
POC HCO3: 13 MMOL/L (ref 24–28)
POC HCO3: 15.1 MMOL/L (ref 24–28)
POC NOTIFIED NOTE: ABNORMAL
POC NOTIFIED NOTE: ABNORMAL
POC PERFORMED BY: ABNORMAL
POC PERFORMED BY: ABNORMAL
POC SATURATED O2: 100 % (ref 95–100)
POC SATURATED O2: 99.7 % (ref 95–100)
POC TEMPERATURE: 37 C
POC TEMPERATURE: 37 C
POCT GLUCOSE: 135 MG/DL (ref 70–110)
POCT GLUCOSE: 57 MG/DL (ref 70–110)
POTASSIUM SERPL-SCNC: 4.4 MMOL/L (ref 3.5–5.1)
PROT SERPL-MCNC: 7.7 G/DL (ref 6–8.4)
PROT UR QL STRIP: ABNORMAL
PROTHROMBIN TIME: 15.3 SEC (ref 9–12.5)
PROVIDER NOTIFIED: ABNORMAL
PROVIDER NOTIFIED: ABNORMAL
RBC # BLD AUTO: 4.49 M/UL (ref 4–5.4)
RBC #/AREA URNS HPF: 6 /HPF (ref 0–4)
SODIUM SERPL-SCNC: 139 MMOL/L (ref 136–145)
SP GR UR STRIP: 1.02 (ref 1–1.03)
SPECIMEN OUTDATE: NORMAL
SPECIMEN SOURCE: ABNORMAL
SPECIMEN SOURCE: ABNORMAL
SQUAMOUS #/AREA URNS HPF: 1 /HPF
TROPONIN I SERPL DL<=0.01 NG/ML-MCNC: 118 NG/L (ref 0–14)
TROPONIN I SERPL DL<=0.01 NG/ML-MCNC: 136 NG/L (ref 0–14)
URN SPEC COLLECT METH UR: ABNORMAL
UROBILINOGEN UR STRIP-ACNC: NEGATIVE EU/DL
VT: 350
WBC # BLD AUTO: 25.71 K/UL (ref 3.9–12.7)
WBC #/AREA URNS HPF: 1 /HPF (ref 0–5)

## 2025-01-01 PROCEDURE — 27100171 HC OXYGEN HIGH FLOW UP TO 24 HOURS

## 2025-01-01 PROCEDURE — 36415 COLL VENOUS BLD VENIPUNCTURE: CPT | Performed by: INTERNAL MEDICINE

## 2025-01-01 PROCEDURE — 25500020 PHARM REV CODE 255: Performed by: INTERNAL MEDICINE

## 2025-01-01 PROCEDURE — 85610 PROTHROMBIN TIME: CPT | Performed by: INTERNAL MEDICINE

## 2025-01-01 PROCEDURE — 25000242 PHARM REV CODE 250 ALT 637 W/ HCPCS: Performed by: INTERNAL MEDICINE

## 2025-01-01 PROCEDURE — 93010 ELECTROCARDIOGRAM REPORT: CPT | Mod: ,,, | Performed by: INTERNAL MEDICINE

## 2025-01-01 PROCEDURE — 96375 TX/PRO/DX INJ NEW DRUG ADDON: CPT

## 2025-01-01 PROCEDURE — 96376 TX/PRO/DX INJ SAME DRUG ADON: CPT

## 2025-01-01 PROCEDURE — 94640 AIRWAY INHALATION TREATMENT: CPT

## 2025-01-01 PROCEDURE — 81000 URINALYSIS NONAUTO W/SCOPE: CPT | Performed by: INTERNAL MEDICINE

## 2025-01-01 PROCEDURE — 87040 BLOOD CULTURE FOR BACTERIA: CPT | Mod: 59 | Performed by: INTERNAL MEDICINE

## 2025-01-01 PROCEDURE — 27000221 HC OXYGEN, UP TO 24 HOURS

## 2025-01-01 PROCEDURE — 94002 VENT MGMT INPAT INIT DAY: CPT

## 2025-01-01 PROCEDURE — 96365 THER/PROPH/DIAG IV INF INIT: CPT

## 2025-01-01 PROCEDURE — 84484 ASSAY OF TROPONIN QUANT: CPT | Mod: 91 | Performed by: INTERNAL MEDICINE

## 2025-01-01 PROCEDURE — 83690 ASSAY OF LIPASE: CPT | Performed by: INTERNAL MEDICINE

## 2025-01-01 PROCEDURE — 85730 THROMBOPLASTIN TIME PARTIAL: CPT | Performed by: INTERNAL MEDICINE

## 2025-01-01 PROCEDURE — 96367 TX/PROPH/DG ADDL SEQ IV INF: CPT

## 2025-01-01 PROCEDURE — 80053 COMPREHEN METABOLIC PANEL: CPT | Performed by: INTERNAL MEDICINE

## 2025-01-01 PROCEDURE — 99900026 HC AIRWAY MAINTENANCE (STAT)

## 2025-01-01 PROCEDURE — 93005 ELECTROCARDIOGRAM TRACING: CPT

## 2025-01-01 PROCEDURE — 99900035 HC TECH TIME PER 15 MIN (STAT)

## 2025-01-01 PROCEDURE — 85027 COMPLETE CBC AUTOMATED: CPT | Performed by: INTERNAL MEDICINE

## 2025-01-01 PROCEDURE — 99900031 HC PATIENT EDUCATION (STAT)

## 2025-01-01 PROCEDURE — 94761 N-INVAS EAR/PLS OXIMETRY MLT: CPT | Mod: XB

## 2025-01-01 PROCEDURE — 83605 ASSAY OF LACTIC ACID: CPT | Performed by: INTERNAL MEDICINE

## 2025-01-01 PROCEDURE — 99291 CRITICAL CARE FIRST HOUR: CPT

## 2025-01-01 PROCEDURE — 82803 BLOOD GASES ANY COMBINATION: CPT

## 2025-01-01 PROCEDURE — 85007 BL SMEAR W/DIFF WBC COUNT: CPT | Performed by: INTERNAL MEDICINE

## 2025-01-01 PROCEDURE — 63600175 PHARM REV CODE 636 W HCPCS: Performed by: INTERNAL MEDICINE

## 2025-01-01 PROCEDURE — 85379 FIBRIN DEGRADATION QUANT: CPT | Performed by: INTERNAL MEDICINE

## 2025-01-01 PROCEDURE — 36600 WITHDRAWAL OF ARTERIAL BLOOD: CPT

## 2025-01-01 PROCEDURE — 31500 INSERT EMERGENCY AIRWAY: CPT

## 2025-01-01 PROCEDURE — 36556 INSERT NON-TUNNEL CV CATH: CPT

## 2025-01-01 PROCEDURE — 86900 BLOOD TYPING SEROLOGIC ABO: CPT | Performed by: INTERNAL MEDICINE

## 2025-01-01 PROCEDURE — 25000003 PHARM REV CODE 250

## 2025-01-01 PROCEDURE — 25000003 PHARM REV CODE 250: Performed by: INTERNAL MEDICINE

## 2025-01-01 PROCEDURE — 96366 THER/PROPH/DIAG IV INF ADDON: CPT

## 2025-01-01 RX ORDER — METHYLPREDNISOLONE SOD SUCC 125 MG
125 VIAL (EA) INJECTION
Status: COMPLETED | OUTPATIENT
Start: 2025-01-01 | End: 2025-01-01

## 2025-01-01 RX ORDER — ETOMIDATE 2 MG/ML
INJECTION INTRAVENOUS
Status: DISCONTINUED
Start: 2025-01-01 | End: 2025-01-01 | Stop reason: HOSPADM

## 2025-01-01 RX ORDER — PANTOPRAZOLE SODIUM 40 MG/10ML
40 INJECTION, POWDER, LYOPHILIZED, FOR SOLUTION INTRAVENOUS
Status: COMPLETED | OUTPATIENT
Start: 2025-01-01 | End: 2025-01-01

## 2025-01-01 RX ORDER — ETOMIDATE 2 MG/ML
INJECTION INTRAVENOUS CODE/TRAUMA/SEDATION MEDICATION
Status: DISCONTINUED | OUTPATIENT
Start: 2025-01-01 | End: 2025-01-01 | Stop reason: HOSPADM

## 2025-01-01 RX ORDER — PROPOFOL 10 MG/ML
20 INJECTION, EMULSION INTRAVENOUS
Status: COMPLETED | OUTPATIENT
Start: 2025-01-01 | End: 2025-01-01

## 2025-01-01 RX ORDER — NOREPINEPHRINE BITARTRATE/D5W 4MG/250ML
PLASTIC BAG, INJECTION (ML) INTRAVENOUS
Status: COMPLETED
Start: 2025-01-01 | End: 2025-01-01

## 2025-01-01 RX ORDER — DEXTROSE 50 % IN WATER (D50W) INTRAVENOUS SYRINGE
25
Status: COMPLETED | OUTPATIENT
Start: 2025-01-01 | End: 2025-01-01

## 2025-01-01 RX ORDER — NOREPINEPHRINE BITARTRATE/D5W 4MG/250ML
0-3 PLASTIC BAG, INJECTION (ML) INTRAVENOUS CONTINUOUS
Status: DISCONTINUED | OUTPATIENT
Start: 2025-01-01 | End: 2025-01-01 | Stop reason: HOSPADM

## 2025-01-01 RX ORDER — IPRATROPIUM BROMIDE AND ALBUTEROL SULFATE 2.5; .5 MG/3ML; MG/3ML
3 SOLUTION RESPIRATORY (INHALATION) ONCE
Status: COMPLETED | OUTPATIENT
Start: 2025-01-01 | End: 2025-01-01

## 2025-01-01 RX ADMIN — ETOMIDATE 10 MG: 2 INJECTION, SOLUTION INTRAVENOUS at 08:03

## 2025-01-01 RX ADMIN — PROPOFOL 20 MCG/KG/MIN: 10 INJECTION, EMULSION INTRAVENOUS at 08:03

## 2025-01-01 RX ADMIN — IPRATROPIUM BROMIDE AND ALBUTEROL SULFATE 3 ML: .5; 3 SOLUTION RESPIRATORY (INHALATION) at 07:03

## 2025-01-01 RX ADMIN — PANTOPRAZOLE SODIUM 40 MG: 40 INJECTION, POWDER, LYOPHILIZED, FOR SOLUTION INTRAVENOUS at 08:03

## 2025-01-01 RX ADMIN — SODIUM CHLORIDE 1000 ML: 9 INJECTION, SOLUTION INTRAVENOUS at 10:03

## 2025-01-01 RX ADMIN — SODIUM CHLORIDE 2000 ML: 9 INJECTION, SOLUTION INTRAVENOUS at 08:03

## 2025-01-01 RX ADMIN — NOREPINEPHRINE BITARTRATE 0.2 MCG/KG/MIN: 4 INJECTION, SOLUTION INTRAVENOUS at 10:03

## 2025-01-01 RX ADMIN — SODIUM BICARBONATE: 84 INJECTION, SOLUTION INTRAVENOUS at 10:03

## 2025-01-01 RX ADMIN — DEXTROSE MONOHYDRATE 25 G: 25 INJECTION, SOLUTION INTRAVENOUS at 12:03

## 2025-01-01 RX ADMIN — METHYLPREDNISOLONE SODIUM SUCCINATE 125 MG: 125 INJECTION, POWDER, FOR SOLUTION INTRAMUSCULAR; INTRAVENOUS at 07:03

## 2025-01-01 RX ADMIN — IOHEXOL 100 ML: 350 INJECTION, SOLUTION INTRAVENOUS at 09:03

## 2025-01-01 RX ADMIN — PIPERACILLIN SODIUM AND TAZOBACTAM SODIUM 4.5 G: 4; .5 INJECTION, POWDER, LYOPHILIZED, FOR SOLUTION INTRAVENOUS at 07:03

## 2025-01-01 RX ADMIN — Medication 0.2 MCG/KG/MIN: at 10:03

## 2025-03-03 NOTE — ED NOTES
Respiratory at bedside setting up for transfer to CT. dR. JHAVERI NOTIFIED PATIENT ATTEMPTING TO GET UP OPENING EYES AND MOVING

## 2025-03-03 NOTE — ED NOTES
AFTER CONFIRMATION OF central line per CT scan, Dr. Harrison recommends not using line at this time

## 2025-03-03 NOTE — ED PROVIDER NOTES
03/03/2025         6:36 AM    Source of History:  History obtained from patient and EMS.     Chief complaint:  From Nurse Triage:  Shortness of Breath (Patient to ED via EMS- hypoxic on NRB. Room air less than 50% found by family at 5:30 am. Family also noted she has been complaining of abdominal pain. )    HISTORY OF PRESENT ILLNES:  Brenda Cotton is a 79 y.o. female  has a past medical history of AAA (abdominal aortic aneurysm), Anticoagulant long-term use, Aortic atherosclerosis, Asthma, COPD (chronic obstructive pulmonary disease), Coronary artery disease, Digestive disorder, GERD (gastroesophageal reflux disease), High cholesterol, History of insomnia, History of pneumonia, Hypertension, PAF (paroxysmal atrial fibrillation), Pulmonary scarring, Sleep apnea, and Vitamin D deficiency. presenting with abdominal pain and shortness of breath this morning.  EMS states sats were 50% on room air on arrival.  Patient is a poor historian.      REVIEW OF SYSTEMS:   Constitutional symptoms:     Skin symptoms:      Eye symptoms:     ENMT symptoms:      Respiratory symptoms:      Cardiovascular symptoms:     Gastrointestinal symptoms:      Genitourinary symptoms:     Musculoskeletal symptoms:      Neurologic symptoms:      Psychiatric symptoms:               Additional review of systems information: Patient Denies Any Other Complaints.    All Other Systems Reviewed With Patient And Negative.    ALLEGIES:  Review of patient's allergies indicates:  No Known Allergies    MEDICINE LIST:  Current Outpatient Medications   Medication Instructions    albuterol-ipratropium (DUO-NEB) 2.5 mg-0.5 mg/3 mL nebulizer solution 3 mLs, Every 6 hours PRN    apixaban (ELIQUIS) 5 mg, Oral, 2 times daily    clopidogreL (PLAVIX) 75 mg, Daily    diltiaZEM (CARDIZEM CD) 180 mg, Oral, Daily    isosorbide mononitrate (IMDUR) 30 mg, Oral, Daily    losartan (COZAAR) 50 mg, Daily    metoprolol succinate (TOPROL-XL) 25 mg, Oral, 2 times  daily, Home dose    rosuvastatin (CRESTOR) 10 mg, Nightly        PMH:  As per HPI and below:    Reviewed and updated in chart.    PAST MEDICAL HISTORY:  Past Medical History:   Diagnosis Date    AAA (abdominal aortic aneurysm)     7cm    Anticoagulant long-term use     Aortic atherosclerosis     Asthma     COPD (chronic obstructive pulmonary disease)     Coronary artery disease     Digestive disorder     GERD    GERD (gastroesophageal reflux disease)     High cholesterol     History of insomnia     History of pneumonia     Hypertension     PAF (paroxysmal atrial fibrillation)     Pulmonary scarring     Sleep apnea     DOES NOT HAVE CPAP    Vitamin D deficiency         PAST SURGICAL HISTORY:  Past Surgical History:   Procedure Laterality Date    ENDOVASCULAR REPAIR OF ANEURYSM Bilateral 5/3/2024    Procedure: REPAIR, ANEURYSM, ENDOVASCULAR;  Surgeon: Andrés Cleary MD;  Location: [facility] CATH;  Service: Cardiology;  Laterality: Bilateral;  pt needs potassium rider prior to surgery    HYSTERECTOMY      LEFT HEART CATHETERIZATION N/A 9/24/2024    Procedure: Left heart cath;  Surgeon: Andrés Cleary MD;  Location: [facility] CATH;  Service: Cardiology;  Laterality: N/A;    OOPHORECTOMY         SOCIAL HISTORY:  Social History[1]    FAMILY HISTORY:  Family History   Problem Relation Name Age of Onset    Hypertension Mother      Liver disease Mother      Stomach cancer Father      No Known Problems Sister      Cancer Brother      Cancer Brother      No Known Problems Daughter      No Known Problems Maternal Aunt      No Known Problems Maternal Uncle      No Known Problems Paternal Aunt      No Known Problems Paternal Uncle      No Known Problems Maternal Grandmother      No Known Problems Maternal Grandfather      No Known Problems Paternal Grandmother      No Known Problems Paternal Grandfather      Asthma Neg Hx      Emphysema Neg Hx      Breast cancer Neg Hx      Ovarian cancer Neg Hx      BRCA 1/2 Neg Hx          PROBLEM  LIST:  Problem List[2]     PHYSICAL EXAM:      ED Triage Vitals   BP    Pulse    Resp    Temp    SpO2         Vital Signs: Reviewed As In Chart.  General:  Alert, No Cardiorespiratory Distress Noted.  Head: Normocephalic   Eye:  Pupils equal and reactive to light and accomodation. Extraocular Movements Are Intact.   ENT: Mucus membranes are moist.   Neck: Supple  Cardiovascular:  Regular Rate And Rhythm     Respiratory:  Diminished breath sounds on the left    Gastrointestinal:  Soft, Non Distended, Non Tenderness, Normal Bowel Sounds.    Neurological:  Alert And Oriented To Person, Place,  Normal Motor Observed, Normal Speech Observed.  Back: Normal range of motion  Musculoskeletal:  No Gross Deformity Noted.   Genital: deferred    Psychiatric:  Cooperative.  Skin: warm, dry  Lymphatic: No lymphadenopathy      ED WORKUP FOR MEDICAL DECISION MAKING:    ED ORDERS:  Orders Placed This Encounter   Procedures    INTUBATION    CENTRAL LINE    Blood culture #1 **CANNOT BE ORDERED STAT**    Blood culture #2 **CANNOT BE ORDERED STAT**    X-Ray Chest 1 View    CTA Chest Non-Coronary (PE Studies)    X-Ray Chest 1 View    CT Abdomen Pelvis With IV Contrast NO Oral Contrast    X-Ray Chest 1 View    CT Chest Without Contrast    CBC auto differential    Comprehensive metabolic panel    D dimer, quantitative    Lactic acid, plasma    TROPONIN I HIGH SENSITIVITY    Protime-INR    APTT    Lipase    Urinalysis, Reflex to Urine Culture Urine, Clean Catch    TROPONIN I HIGH SENSITIVITY    Urinalysis Microscopic    Cardiac Monitoring - Adult    Nasogastric/Orogastric tube insertion    Nasogastric/Orogastric tube maintenance    Pulse Oximetry Continuous    POCT ARTERIAL BLOOD GAS Blood Gas    Inhalation Treatment Once    POCT Arterial Blood Gas-Resp    Mechanical ventilation Continuous    SUCTION PRN    POCT ARTERIAL BLOOD GAS Blood Gas    POCT Arterial Blood Gas-Resp    POCT COVID-19 Rapid Screening    POCT Influenza A/B Molecular     EKG 12-lead    Type & Screen    Insert Saline lock IV    PFC Facilitated Request       ED MEDICINES:  Medications   etomidate injection ( Intravenous Canceled Entry 3/3/25 0830)   sodium bicarbonate 150 mEq in D5W 1,000 mL infusion ( Intravenous Verify Only 3/3/25 1245)   NORepinephrine 4 mg in dextrose 5% 250 mL infusion (premix) (0.8 mcg/kg/min × 45.4 kg Intravenous Verify Only 3/3/25 1245)   methylPREDNISolone sodium succinate injection 125 mg (125 mg Intravenous Given 3/3/25 0749)   albuterol-ipratropium 2.5 mg-0.5 mg/3 mL nebulizer solution 3 mL (3 mLs Nebulization Given 3/3/25 0725)   piperacillin-tazobactam (ZOSYN) 4.5 g in D5W 100 mL IVPB (MB+) (0 g Intravenous Stopped 3/3/25 0838)   sodium chloride 0.9% bolus 2,000 mL 2,000 mL (0 mLs Intravenous Stopped 3/3/25 1008)   pantoprazole injection 40 mg (40 mg Intravenous Given 3/3/25 0852)   propofol (DIPRIVAN) 10 mg/mL infusion (25 mcg/kg/min × 45.4 kg Intravenous Verify Only 3/3/25 1245)   iohexoL (OMNIPAQUE 350) injection 100 mL (100 mLs Intravenous Given 3/3/25 0928)   sodium chloride 0.9% bolus 1,000 mL 1,000 mL (0 mLs Intravenous Stopped 3/3/25 1140)   dextrose 50 % in water (D50W) injection 25 g (25 g Intravenous Given 3/3/25 1228)            ECG Results              EKG 12-lead (Final result)        Collection Time Result Time QRS Duration OHS QTC Calculation    03/03/25 07:11:11 03/03/25 12:24:01 72 447                     Final result by Interface, Lab In [facility] (03/03/25 12:24:09)                   Narrative:    Test Reason : R06.00,    Vent. Rate : 108 BPM     Atrial Rate : 108 BPM     P-R Int : 150 ms          QRS Dur :  72 ms      QT Int : 334 ms       P-R-T Axes :  87  55 104 degrees    QTcB Int : 447 ms    Baseline Artifact  Sinus tachycardia  Right atrial enlargement  Poor R-wave progression  Nonspecific ST abnormality  Abnormal ECG  When compared with ECG of 05-May-2024 01:49,  Sinus rhythm has replaced Atrial fibrillation  Nonspecific T wave  abnormality no longer evident in Inferior leads  Nonspecific T wave abnormality, improved in Anterior leads  Increase in QRS voltage  Confirmed by Nazario Colindres ([id number]) on 3/3/2025 12:23:57 PM    Referred By: AAAREFERRAL SELF           Confirmed By: Nazario Colindres                                    ED LABS ORDERED AND REVIEWED:  Admission on 03/03/2025   Component Date Value Ref Range Status    QRS Duration 03/03/2025 72  ms Final    OHS QTC Calculation 03/03/2025 447  ms Final    WBC 03/03/2025 25.71 (H)  3.90 - 12.70 K/uL Final    RBC 03/03/2025 4.49  4.00 - 5.40 M/uL Final    Hemoglobin 03/03/2025 12.6  12.0 - 16.0 g/dL Final    Hematocrit 03/03/2025 41.4  37.0 - 48.5 % Final    MCV 03/03/2025 92  82 - 98 fL Final    MCH 03/03/2025 28.1  27.0 - 31.0 pg Final    MCHC 03/03/2025 30.4 (L)  32.0 - 36.0 g/dL Final    RDW 03/03/2025 14.7 (H)  11.5 - 14.5 % Final    Platelets 03/03/2025 257  150 - 450 K/uL Final    MPV 03/03/2025 10.5  9.2 - 12.9 fL Final    Immature Granulocytes 03/03/2025 Test Not Performed  0.0 - 0.5 % Corrected    Immature Grans (Abs) 03/03/2025 Test Not Performed  0.00 - 0.04 K/uL Corrected    nRBC 03/03/2025 0  0 /100 WBC Final    Gran % 03/03/2025 80.0 (H)  38.0 - 73.0 % Corrected    Lymph % 03/03/2025 9.0 (L)  18.0 - 48.0 % Corrected    Mono % 03/03/2025 4.0  4.0 - 15.0 % Corrected    Eosinophil % 03/03/2025 0.0  0.0 - 8.0 % Final    Basophil % 03/03/2025 0.0  0.0 - 1.9 % Corrected    Bands 03/03/2025 6.0  % Final    Metamyelocytes 03/03/2025 1.0  % Final    Differential Method 03/03/2025 Manual   Corrected    Sodium 03/03/2025 139  136 - 145 mmol/L Final    Potassium 03/03/2025 4.4  3.5 - 5.1 mmol/L Final    Chloride 03/03/2025 95  95 - 110 mmol/L Final    CO2 03/03/2025 16 (L)  23 - 29 mmol/L Final    Glucose 03/03/2025 64 (L)  70 - 110 mg/dL Final    BUN 03/03/2025 36 (H)  8 - 23 mg/dL Final    Creatinine 03/03/2025 1.9 (H)  0.5 - 1.4 mg/dL Final    Calcium 03/03/2025 10.3  8.7 - 10.5 mg/dL Final     Total Protein 03/03/2025 7.7  6.0 - 8.4 g/dL Final    Albumin 03/03/2025 3.1 (L)  3.5 - 5.2 g/dL Final    Total Bilirubin 03/03/2025 1.3 (H)  0.1 - 1.0 mg/dL Final    Alkaline Phosphatase 03/03/2025 97  55 - 135 U/L Final    AST 03/03/2025 231 (H)  10 - 40 U/L Final    ALT 03/03/2025 204 (H)  10 - 44 U/L Final    eGFR 03/03/2025 26.2 (A)  >60 mL/min/1.73 m^2 Final    Anion Gap 03/03/2025 28 (H)  8 - 16 mmol/L Final    D-Dimer 03/03/2025 28.98 (H)  <0.50 mg/L FEU Final    Lactate (Lactic Acid) 03/03/2025 >12.0 (HH)  0.5 - 2.2 mmol/L Final    Troponin I High Sensitivity 03/03/2025 136 (H)  0 - 14 ng/L Final    Prothrombin Time 03/03/2025 15.3 (H)  9.0 - 12.5 sec Final    INR 03/03/2025 1.4 (H)  0.8 - 1.2 Final    aPTT 03/03/2025 27.7  21.0 - 32.0 sec Final    Lipase 03/03/2025 7  4 - 60 U/L Final    POC PH 03/03/2025 7.215 (LL)  7.345 - 7.450 Final    POC PCO2 03/03/2025 36.5  35.0 - 45.0 mmHg Final    POC PO2 03/03/2025 285 (H)  80.0 - 100 mmHg Final    POC SATURATED O2 03/03/2025 99.7  95.0 - 100.0 % Final    POC HCO3 03/03/2025 15.1 (L)  24.0 - 28.0 mmol/l Final    Base Deficit 03/03/2025 -13.0 (L)  -2.0 - 2.0 mmol/l Final    POC Temp 03/03/2025 37.0  C Final    Specimen source 03/03/2025 Arterial   Final    Performed By: 03/03/2025 Lamar   Final    MODIFIED ILENE'S TEST 03/03/2025 NA   Final-Edited    FiO2 03/03/2025 100.0  % Final    O2DEVICE 03/03/2025 Non-Rebreather   Final-Edited    LPM 03/03/2025 15.0   Final-Edited    Provider Notified: 03/03/2025 CAMI CHAMBERS   Final-Edited    Notified Time 03/03/2025 03/03/2025 07:11   Final-Edited    Notified By 03/03/2025 Lamar   Final-Edited    Notified Note 03/03/2025 Called and read back by CAMI CHAMBERS   Final-Edited    Specimen UA 03/03/2025 Urine, Clean Catch   Final    Color, UA 03/03/2025 Yellow  Yellow, Straw, Indu Final    Appearance, UA 03/03/2025 Clear  Clear Final    pH, UA 03/03/2025 5.0  5.0 - 8.0 Final    Specific Gravity, UA 03/03/2025 1.020  1.005 - 1.030  Final    Protein, UA 03/03/2025 1+ (A)  Negative Final    Glucose, UA 03/03/2025 Trace (A)  Negative Final    Ketones, UA 03/03/2025 Negative  Negative Final    Bilirubin (UA) 03/03/2025 Negative  Negative Final    Occult Blood UA 03/03/2025 Trace (A)  Negative Final    Nitrite, UA 03/03/2025 Negative  Negative Final    Urobilinogen, UA 03/03/2025 Negative  <2.0 EU/dL Final    Leukocytes, UA 03/03/2025 Negative  Negative Final    POCT Glucose 03/03/2025 135 (H)  70 - 110 mg/dL Final    Group & Rh 03/03/2025 O POS   Final    Indirect [place] 03/03/2025 NEG   Final    Specimen Outdate 03/03/2025 03/06/2025 23:59   Final    Troponin I High Sensitivity 03/03/2025 118 (H)  0 - 14 ng/L Final    RBC, UA 03/03/2025 6 (H)  0 - 4 /hpf Final    WBC, UA 03/03/2025 1  0 - 5 /hpf Final    Bacteria 03/03/2025 None  None-Occ /hpf Final    Squam Epithel, UA 03/03/2025 1  /hpf Final    Hyaline Casts, UA 03/03/2025 14 (A)  0-1/lpf /lpf Final    Microscopic Comment 03/03/2025 SEE COMMENT   Final    POC PH 03/03/2025 7.152 (LL)  7.345 - 7.450 Final    POC PCO2 03/03/2025 36.8  35.0 - 45.0 mmHg Final    POC PO2 03/03/2025 351 (H)  80.0 - 100 mmHg Final    POC SATURATED O2 03/03/2025 100.0  95.0 - 100.0 % Final    POC HCO3 03/03/2025 13.0  24.0 - 28.0 mmol/l Final    Base Deficit 03/03/2025 -15.9  -2.0 - 2.0 mmol/l Final    POC Temp 03/03/2025 37.0  C Final    Specimen source 03/03/2025 Arterial   Final    Performed By: 03/03/2025 Plessala   Final    FiO2 03/03/2025 60.0  % Final    O2DEVICE 03/03/2025 Ventilator   Final-Edited    AC 03/03/2025 18   Final-Edited    Vt 03/03/2025 350   Final-Edited    PEEP 03/03/2025 5.0   Final-Edited    PEAK FLOW 03/03/2025 60.0   Final-Edited    Provider Notified: 03/03/2025 JESSICA SWENSON   Final-Edited    Notified Time 03/03/2025 03/03/2025 10:17   Final-Edited    Notified By 03/03/2025 Lou   Final-Edited    Notified Note 03/03/2025 Called and read back by JESSICA SWENSON   Final-Edited    POCT Glucose  03/03/2025 57 (L)  70 - 110 mg/dL Final       RADIOLOGY STUDIES ORDERED AND REVIEWED:  Imaging Results              CT Chest Without Contrast (In process)  Result time 03/03/25 12:50:53                     X-Ray Chest 1 View (In process)                      CT Abdomen Pelvis With IV Contrast NO Oral Contrast (Final result)  Result time 03/03/25 10:01:32      Final result by Natasha Shafer MD (03/03/25 10:01:32)                   Impression:      1. Multiple findings highly suspicious for bowel ischemia.  Portal venous air, pneumatosis, hypoenhancing spleen, pancreas, and paucity opacified mesenteric arteries  2. Aorto bi-iliac graft in place without evidence of aortic rupture.      Electronically signed by: Natasha Shafer MD  Date:    03/03/2025  Time:    10:01               Narrative:    EXAMINATION:  CT ABDOMEN PELVIS WITH IV CONTRAST    CLINICAL HISTORY:  Abdominal pain, acute, nonlocalized;    CT/Cardiac Nuclear exams in prior 12 months: 0    TECHNIQUE:  CT abdomen and pelvis with IV contrast.  Coronal reformats prepared.  Iterative reconstruction utilized.    COMPARISON:  None    FINDINGS:  Diffuse, multiple foci of intrahepatic portal venous air and long segment pneumatosis appearing to involve the entire colon and some small bowel as well.  The stomach is mildly distended and thin-walled, a nonspecific finding.    Interval endovascular repair of an aortic aneurysm with a bifurcated aorto bi-iliac graft in place.  No findings of aortic rupture.    The spleen is not enhancing with apparent splenic artery occlusion.  Also, majority of the pancreas appears to be hypoenhancing as well.    Hyperenhancing adrenal glands and arterial phase images of kidneys.  Possible small area of ischemia inferior pole right kidney.  Overall paucity opacified mesenteric vessels.                                       CTA Chest Non-Coronary (PE Studies) (Final result)  Result time 03/03/25 09:48:34      Final result by  Natasha Shafer MD (03/03/25 09:48:34)                   Impression:      No evidence of pulmonary emboli    Fluid throughout the esophagus, nonspecific    Cavitary lung disease as previously discussed on most recent available CT of 05/05/2024.  Please see that report.  No enlarging components detected to suggest malignancy, though given pattern of abnormality, periodic surveillance should be considered      Electronically signed by: Natasha Shafer MD  Date:    03/03/2025  Time:    09:48               Narrative:    EXAMINATION:  CTA CHEST NON CORONARY (PE STUDIES)    CLINICAL HISTORY:  Pulmonary embolism (PE) suspected, high prob;    CT/Cardiac Nuclear exams in prior 12 months: 0    TECHNIQUE:  Pulmonary CT angiogram with IV contrast.  Coronal MIP reconstructions prepared.  Iterative reconstruction utilized.    FINDINGS:  No evidence of pulmonary emboli.  Normal caliber pulmonary trunk.    Severe chronic lung disease with large right apical cavity, collapsed left apical cavity with bronchiectasis and mucous plugging.  Improved aeration along the inferior margin of the right upper lobe cavitary lesion since prior suggesting pneumonia on the prior study.  No discrete enlarging components evident to suggest malignancy, though given the abnormalities, periodic surveillance should be considered.    No pleural or pericardial fluid.  No pneumothorax.    NG tube is present.  There is fluid throughout the esophagus.                                       X-Ray Chest 1 View (Final result)  Result time 03/03/25 09:37:26      Final result by Natasha Shafer MD (03/03/25 09:37:26)                   Impression:      Interval intubation and placement of NG tube      Electronically signed by: Natasha Shafer MD  Date:    03/03/2025  Time:    09:37               Narrative:    EXAMINATION:  XR CHEST 1 VIEW    CLINICAL HISTORY:  Presence of other specified devices    COMPARISON:  Exam earlier same  date    FINDINGS:  Endotracheal tube tip extends below clavicular head level in lies above haily.  NG tube extends into left abdomen off the field of view                        Wet Read by Dennis Valencia MD (03/03/25 09:18:56, [facility] Emergency Department, Emergency Medicine)    Appropriate placement of ET tube                                     X-Ray Chest 1 View (Final result)  Result time 03/03/25 09:32:29      Final result by Natasha Shafer MD (03/03/25 09:32:29)                   Impression:      No acute radiographic abnormalities    Known cavitary lung disease, radiographically unchanged.  Please see prior CT of 05/05/2024      Electronically signed by: Natasha Shafer MD  Date:    03/03/2025  Time:    09:32               Narrative:    EXAMINATION:  XR CHEST 1 VIEW    CLINICAL HISTORY:  Dyspnea, unspecified    COMPARISON:  05/05/2024    FINDINGS:  No acute infiltrates or signs of CHF.  No pleural fluid or pneumothorax.    Known cavitary lung disease with large right upper lobe/apical cavity and multifocal pleuroparenchymal opacities, better demonstrated on prior CT.  Radiographically, these findings have not appreciably changed.                                      MEDICAL DECISION MAKING:    Brenda Cotton is 79 y.o. female who  has a past medical history of AAA (abdominal aortic aneurysm), Anticoagulant long-term use, Aortic atherosclerosis, Asthma, COPD (chronic obstructive pulmonary disease), Coronary artery disease, Digestive disorder, GERD (gastroesophageal reflux disease), High cholesterol, History of insomnia, History of pneumonia, Hypertension, PAF (paroxysmal atrial fibrillation), Pulmonary scarring, Sleep apnea, and Vitamin D deficiency. arrives in ER with c/o Shortness of Breath (Patient to ED via EMS- hypoxic on NRB. Room air less than 50% found by family at 5:30 am. Family also noted she has been complaining of abdominal pain. )      Reviewed Nurses Note. Reviewed  Vital Signs.     Reviewed Pertinent old records, History and updated as necessary.    Vitals:    03/03/25 1244   BP: (!) 122/56   Pulse: 96   Resp: (!) 29   Temp:         Medical Decision Making  Differential diagnosis includes but is not limited to pneumonia, bronchitis, CHF, pulmonary embolus, COPD, asthma, pulmonary edema, pleural effusion, acute myocardial infarction, upper respiratory infection, influenza, COVID, sinusitis, allergies and anxiety.    Amount and/or Complexity of Data Reviewed  Labs: ordered. Decision-making details documented in ED Course.  Radiology: ordered and independent interpretation performed. Decision-making details documented in ED Course.     Details: Right upper lobe scarring left upper lobe nodule present on previous x-rays.  ECG/medicine tests: ordered and independent interpretation performed.     Details: Sinus tachycardia rate of 108 right atrial enlargement, nonspecific ST-T changes no ectopy normal intervals normal axis    Risk  Prescription drug management.    Critical Care  Total time providing critical care: 65 minutes              ED Course as of 03/03/25 1302   Mon Mar 03, 2025   0800 D-Dimer(!): 28.98 [KK]   0801 Troponin I High Sensitivity(!): 136 [KK]   1143 X-Ray Chest 1 View [KK]      ED Course User Index  [KK] Dennis Valencia MD         Patient became less responsive with agonal respirations.  Decision was made at this time to intubate patient.  Family consulted and they were unsure of her DNR status so agreed to intubate the patient.  This patient does have evidence of infective focus  My overall impression is sepsis.  Source: Unknown  Antibiotics given- Antibiotics (72h ago, onward)    None      Latest lactate reviewed-    Patient appears to have ischemic bowel on CT.  Patient acidotic.  Discussed with nurse practitioner with Dr. Winchester.  Patient will need surgical consultation which we do not have available here today.  We will transfer out.  Lab              03/03/25                       0720          LACTATE      >12.0*        Organ dysfunction indicated by Acute respiratory failure    Fluid challenge Actual Body weight- Patient will receive 30ml/kg actual body weight to calculate fluid bolus for treatment of septic shock.     Post- resuscitation assessment Yes - I attest a sepsis perfusion exam was performed within 6 hours of sepsis, severe sepsis, or septic shock presentation, following fluid resuscitation.      Will Not start Pressors- Levophed for MAP of 65  Source control achieved by:  Department       PROCEDURES PERFORMED IN ED:  Intubation    Date/Time: 3/3/2025 8:21 AM  Location procedure was performed: [facility] EMERGENCY DEPARTMENT    Performed by: Dennis Valencia MD  Authorized by: Dennis Valencia MD  Consent Done: Emergent Situation  Indications: airway protection  Intubation method: video-assisted  Patient status: sedated  Preoxygenation: BVM  Sedatives: etomidate  Paralytic: none  Laryngoscope size: Glide 3  Tube size: 7.5 mm  Tube type: cuffed  Number of attempts: 1  Ventilation between attempts: BVM  Cricoid pressure: no  Cords visualized: yes  Post-procedure assessment: chest rise, ETCO2 monitor and CO2 detector  Breath sounds: clear  Cuff inflated: yes  ETT to teeth: 23 cm  Tube secured with: ETT bonilla  Chest x-ray interpreted by me.  Chest x-ray findings: endotracheal tube in appropriate position  Patient tolerance: Patient tolerated the procedure well with no immediate complications  Complications: No  Specimens: No  Implants: No      Central Line    Date/Time: 3/3/2025 11:31 AM    Performed by: Dennis Valencia MD  Authorized by: Dennis Valencia MD    Location procedure was performed:  [facility] EMERGENCY DEPARTMENT  Consent Done ?:  Emergent Situation  Time out complete?: Verified correct patient, procedure, equipment, staff, and site/side    Indications:  Vascular access and med administration  Anesthesia:  Local infiltration  Local anesthetic:  Lidocaine  1% without epinephrine  Anesthetic total (ml):  1  Preparation:  Skin prepped with ChloraPrep  Skin prep agent dried: Skin prep agent completely dried prior to procedure    Sterile barriers: All five maximal sterile barriers used - gloves, gown, cap, mask and large sterile sheet    Hand hygiene: Hand hygiene performed immediately prior to central venous catheter insertion    Location:  Left internal jugular  Catheter type:  Triple lumen  Catheter size:  7 Fr  Ultrasound guidance: Yes    Vessel Caliber:  Medium   patent  Comprressibility:  Normal  Needle advanced into vessel with real time ultrasound guidance.    Guidewire confirmed in vessel.    Steril sheath on probe.    Sterile gel used.  Manometry: No    Number of attempts:  2  Securement:  Line sutured, chlorhexidine patch, sterile dressing applied and blood return through all ports  Complications: Yes (specify) (Hematoma right neck from unsuccessful attempt.)    Estimated blood loss (mL):  5  Specimens: No    Implants: No    Guidewire: guidewire removed intact    XRay:  No pneumothorax on x-ray  Adverse Events:  None and local hematoma  Other Complications:  Attempted internal jugular placement on right but was unable to cannulate vein successfully.  Vein collapsed just distal to the insertion site.  Patient did develop a hematoma on that right side.  Reviewed CT scan for placement with radiologist.  There is no pneumothorax but is unsure where the tip of the catheter is.  Given the patient is on Plavix and Eliquis recommended leaving it in until a more definitive study for placement could be done i.e. fluoroscopy with dye.   Attempted internal jugular placement on right but was unable to cannulate vein successfully.  Vein collapsed just distal to the insertion site.  Patient did develop a hematoma on that right side.  Reviewed CT scan for placement with radiologist.  There is no pneumothorax but is unsure where the tip of the catheter is.  Given the patient is  on Plavix and Eliquis recommended leaving it in until a more definitive study for placement could be done i.e. fluoroscopy with dye.Termination Site: superior vena cava      DIAGNOSTIC IMPRESSION:        ICD-10-CM ICD-9-CM   1. Ischemic necrosis of small bowel  K55.029 557.0   2. Dyspnea  R06.00 786.09   3. Endotracheal tube present  Z97.8 V49.89   4. Encounter for central line placement  Z45.2 V58.81   5. Metabolic acidosis  E87.20 276.2   6. Septic shock  A41.9 038.9    R65.21 785.52     995.92         ED Disposition Condition    Transfer to Another Facility Stable               Medication List        ASK your doctor about these medications      albuterol-ipratropium 2.5 mg-0.5 mg/3 mL nebulizer solution  Commonly known as: DUO-NEB     apixaban 5 mg Tab  Commonly known as: ELIQUIS  Take 1 tablet (5 mg total) by mouth 2 (two) times daily.     clopidogreL 75 mg tablet  Commonly known as: PLAVIX     diltiaZEM 180 MG 24 hr capsule  Commonly known as: CARDIZEM CD  Take 1 capsule (180 mg total) by mouth once daily.     isosorbide mononitrate 30 MG 24 hr tablet  Commonly known as: IMDUR  Take 1 tablet (30 mg total) by mouth once daily.     losartan 50 MG tablet  Commonly known as: COZAAR     metoprolol succinate 25 MG 24 hr tablet  Commonly known as: TOPROL-XL  Take 1 tablet (25 mg total) by mouth 2 (two) times daily. Home dose     rosuvastatin 10 MG tablet  Commonly known as: CRESTOR                               [1]   Social History  Tobacco Use    Smoking status: Former     Current packs/day: 0.00     Average packs/day: 1 pack/day for 30.0 years (30.0 ttl pk-yrs)     Types: Cigarettes     Start date: 1977     Quit date: 2007     Years since quittin.8    Smokeless tobacco: Never   Substance Use Topics    Alcohol use: No    Drug use: No   [2]   Patient Active Problem List  Diagnosis    Emphysema/COPD    Lung nodule    CAD (coronary artery disease)    Dizziness    AAA (abdominal aortic aneurysm) without  rupture    Abnormal myocardial perfusion study    Angina, class III        Dennis Valencia MD  03/03/25 2393

## 2025-03-03 NOTE — ED NOTES
Bruising and swelling noted to the right side of neck from previous attempt at central line per Dr. Valencia, bleeding controled and dressed.

## 2025-03-03 NOTE — ED NOTES
DO NOT USE CENTRAL LINE UNTIL VERIFIED WITH CONTRAST TO SEE IF IN A VESSEL, DR. JHAVERI DOES NOT WANT TO PULL DUE TO PATIENT BEING ON BLOOD THINNER.

## 2025-03-03 NOTE — ED NOTES
Pt accepted at [facility] [place] by Dr Maher #[phone] ED - Dr requests [facility]

## 2025-03-06 LAB
BACTERIA BLD CULT: ABNORMAL

## 2025-03-08 LAB — BACTERIA BLD CULT: NORMAL
